# Patient Record
Sex: MALE | Race: WHITE | NOT HISPANIC OR LATINO | Employment: UNEMPLOYED | URBAN - METROPOLITAN AREA
[De-identification: names, ages, dates, MRNs, and addresses within clinical notes are randomized per-mention and may not be internally consistent; named-entity substitution may affect disease eponyms.]

---

## 2020-06-08 ENCOUNTER — TELEPHONE (OUTPATIENT)
Dept: SCHEDULING | Facility: IMAGING CENTER | Age: 35
End: 2020-06-08

## 2020-06-19 ENCOUNTER — OFFICE VISIT (OUTPATIENT)
Dept: MEDICAL GROUP | Facility: MEDICAL CENTER | Age: 35
End: 2020-06-19
Payer: COMMERCIAL

## 2020-06-19 VITALS
WEIGHT: 231.7 LBS | DIASTOLIC BLOOD PRESSURE: 66 MMHG | BODY MASS INDEX: 28.22 KG/M2 | TEMPERATURE: 99.6 F | HEIGHT: 76 IN | SYSTOLIC BLOOD PRESSURE: 120 MMHG | RESPIRATION RATE: 16 BRPM | OXYGEN SATURATION: 97 % | HEART RATE: 78 BPM

## 2020-06-19 DIAGNOSIS — Z00.00 ENCOUNTER FOR PREVENTIVE HEALTH EXAMINATION: ICD-10-CM

## 2020-06-19 DIAGNOSIS — Z13.1 DIABETES MELLITUS SCREENING: ICD-10-CM

## 2020-06-19 DIAGNOSIS — E78.5 DYSLIPIDEMIA: ICD-10-CM

## 2020-06-19 DIAGNOSIS — Z11.4 SCREENING FOR HIV (HUMAN IMMUNODEFICIENCY VIRUS): ICD-10-CM

## 2020-06-19 DIAGNOSIS — E55.9 VITAMIN D DEFICIENCY: ICD-10-CM

## 2020-06-19 PROCEDURE — 99385 PREV VISIT NEW AGE 18-39: CPT | Performed by: INTERNAL MEDICINE

## 2020-06-19 ASSESSMENT — PATIENT HEALTH QUESTIONNAIRE - PHQ9: CLINICAL INTERPRETATION OF PHQ2 SCORE: 0

## 2020-06-19 NOTE — PROGRESS NOTES
"New Patient to Establish    Reason to establish: New patient to establish    Anurag Lagunas is a 34 y.o. male who presents today with the following:    CC:   Chief Complaint   Patient presents with   • Establish Care   • Annual Exam       HPI:     Patient is here for general preventive physical exam. He has no complains  He has hx of hyperlipidemia on OTC fish oil and lifestyle modifications  Hx of vit D def on OTC vit D3 replacement        Current Outpatient Medications:   •  Fish Oil OIL, 3 Each by Does not apply route every day. Omega 3 Fish Oil (735 EPA/ 165 DHA), Disp: , Rfl:   •  Ascorbic Acid (VITAMIN C PO), Take 1 Tab by mouth as needed., Disp: , Rfl:   •  Cholecalciferol (VITAMIN D PO), Take  by mouth as needed., Disp: , Rfl:     Allergies, past medical history, past surgical history, medications, family history, social history reviewed and updated.    ROS     Constitutional: Denies fevers or chills  Eyes: Denies changes in vision  Ears/Nose/Throat/Mouth: Denies nasal congestion or sore throat   Cardiovascular: Denies chest pain or palpitations   Respiratory: Denies shortness of breath , Denies cough  Gastrointestinal/Hepatic: Denies abd pain, nausea, vomiting   Genitourinary: Denies dysuria or frequency  Musculoskeletal/Rheum: Denies joint pain and swelling   Neurological: Denies headache  Psychiatric: Denies mood disorder   Endocrine: Denies hx of diabetes or thyroid dysfunction  Heme/Oncology/Lymph Nodes: Denies weight changes or enlarged LNs.    Physical Exam  /66 (BP Location: Left arm, Patient Position: Sitting, BP Cuff Size: Adult)   Pulse 78   Temp 37.6 °C (99.6 °F) (Temporal)   Resp 16   Ht 1.93 m (6' 4\")   Wt 105.1 kg (231 lb 11.3 oz) Comment: wearing shoes  SpO2 97%   BMI 28.20 kg/m²   General: Normal appearance.  Well developed, well nourished, no acute distress.  HEENT: Normocephalic.  Extraocular motion intact. Pupils are equally round, reactive to light and accommodation, " conjunctiva clear, no scleral icterus.  Ears: normal shape and contour, ear canals clear, tympanic membranes intact. Hearing intact.  Oropharynx clear, no erythema, edema or exudate noted.  NECK: Thyroid is not enlarged. No JVD.  No carotid bruits. No masses.  Cardiovascular: Regular rhythm and rate. No murmur/rubs/gallops.   Respiratory: Normal respiratory effort, clear to auscultation bilaterally. No wheezing/rales/rhonchi.    Abdomen: Bowel sounds present, soft, nontender, nondistended, no rebound, no guarding. No hepatosplenomegaly.  : No suprapubic tenderness. No CVA tenderness.   EXT: no LE edema b/l. No cyanosis.  No clubbing.  Lymph: No cervical, supraclavicular or axillary lymph nodes are palpable  Skin: Warm and dry.  No suspicious lesions or rashes.   Neurologic: No focal deficits.  Cranial nerves II through XII grossly intact.  Sensation intact.  Deep tendon reflexes 2+  Psych: AAOx3,  Normal mood and affect, normal judgment and insight, memory within normal limits        Assessment and Plan     Encounter for preventive health examination  - Lipid Profile; Future  - TSH WITH REFLEX TO FT4; Future  - Comp Metabolic Panel; Future  - CBC WITH DIFFERENTIAL; Future     Screening for HIV (human immunodeficiency virus)  - HIV AG/AB COMBO ASSAY SCREENING; Future     Vitamin D deficiency disease  OTC vit D replacement     Dyslipidemia  - Lipid Profile; Future  Lifestyle modifications      Follow-up:Return if symptoms worsen or fail to improve.    This note was created using voice recognition software. There may be unintended errors in spelling, grammar or content.

## 2020-06-26 DIAGNOSIS — Z00.00 ENCOUNTER FOR PREVENTIVE HEALTH EXAMINATION: ICD-10-CM

## 2024-02-07 ENCOUNTER — OFFICE VISIT (OUTPATIENT)
Dept: URGENT CARE | Facility: PHYSICIAN GROUP | Age: 39
End: 2024-02-07
Payer: COMMERCIAL

## 2024-02-07 VITALS
RESPIRATION RATE: 17 BRPM | OXYGEN SATURATION: 97 % | SYSTOLIC BLOOD PRESSURE: 112 MMHG | BODY MASS INDEX: 29.35 KG/M2 | WEIGHT: 241 LBS | TEMPERATURE: 98.1 F | DIASTOLIC BLOOD PRESSURE: 70 MMHG | HEART RATE: 90 BPM | HEIGHT: 76 IN

## 2024-02-07 DIAGNOSIS — R11.0 NAUSEA: ICD-10-CM

## 2024-02-07 DIAGNOSIS — R19.7 DIARRHEA OF PRESUMED INFECTIOUS ORIGIN: ICD-10-CM

## 2024-02-07 PROCEDURE — 99213 OFFICE O/P EST LOW 20 MIN: CPT | Performed by: STUDENT IN AN ORGANIZED HEALTH CARE EDUCATION/TRAINING PROGRAM

## 2024-02-07 PROCEDURE — 3078F DIAST BP <80 MM HG: CPT | Performed by: STUDENT IN AN ORGANIZED HEALTH CARE EDUCATION/TRAINING PROGRAM

## 2024-02-07 PROCEDURE — 3074F SYST BP LT 130 MM HG: CPT | Performed by: STUDENT IN AN ORGANIZED HEALTH CARE EDUCATION/TRAINING PROGRAM

## 2024-02-07 RX ORDER — AZITHROMYCIN 500 MG/1
500 TABLET, FILM COATED ORAL DAILY
Qty: 3 TABLET | Refills: 0 | Status: SHIPPED | OUTPATIENT
Start: 2024-02-07 | End: 2024-02-10

## 2024-02-07 RX ORDER — ONDANSETRON 4 MG/1
4 TABLET, ORALLY DISINTEGRATING ORAL EVERY 6 HOURS PRN
Qty: 15 TABLET | Refills: 0 | Status: SHIPPED | OUTPATIENT
Start: 2024-02-07

## 2024-02-07 ASSESSMENT — ENCOUNTER SYMPTOMS
COUGH: 0
CHILLS: 1
VOMITING: 0
DIARRHEA: 1
SWEATS: 1
MYALGIAS: 1
FLATUS: 1
BLOATING: 1
ABDOMINAL PAIN: 1

## 2024-02-07 NOTE — PROGRESS NOTES
"Subjective     Anurag Marques Lagunas is a 38 y.o. male who presents with Cramps (X4days Stomach cramps, body aches, chills)            Ramirez is a 38 y.o. male who presents to urgent care with abdominal cramping, body aches, chills, nausea and diarrhea.  Patient states symptoms started approximately 4 days ago.  Patient reports multiple episodes of diarrhea a day.  Reports the diarrhea is soft/watery.  No known risk factors including recent antibiotic use, recent hospitalization, recent travel.  No suspected food intake or close contacts experiencing similar symptoms.  No fever but reports sweats/chills as well as body aches.  No URI-like symptoms.  Patient has tried OTC Pepto-Bismol with no relief of symptoms.  Patient is leaving for [place] this weekend for Super Bowl weekend and is hoping symptoms will improve by then.  Patient reports no blood in stool.  Patient has had decreased appetite but is tolerating p.o. food/fluids and staying hydrated.    Diarrhea   This is a new problem. The current episode started in the past 7 days. The problem occurs 5 to 10 times per day. Diarrhea characteristics: miixed soft/watery. Associated symptoms include abdominal pain, bloating, chills, increased flatus, myalgias and sweats. Pertinent negatives include no coughing, URI or vomiting. There are no known risk factors. He has tried bismuth subsalicylate for the symptoms. The treatment provided no relief.       Review of Systems   Constitutional:  Positive for chills.   Respiratory:  Negative for cough.    Gastrointestinal:  Positive for abdominal pain, bloating, diarrhea and flatus. Negative for vomiting.   Musculoskeletal:  Positive for myalgias.              Objective     /70   Pulse 90   Temp 36.7 °C (98.1 °F) (Temporal)   Resp 17   Ht 1.93 m (6' 4\")   Wt 109 kg (241 lb)   SpO2 97%   BMI 29.34 kg/m²      Physical Exam  Vitals reviewed.   Constitutional:       General: He is not in acute distress.     Appearance: " Normal appearance. He is not toxic-appearing.   HENT:      Head: Normocephalic and atraumatic.      Mouth/Throat:      Mouth: Mucous membranes are moist.   Eyes:      Extraocular Movements: Extraocular movements intact.      Conjunctiva/sclera: Conjunctivae normal.      Pupils: Pupils are equal, round, and reactive to light.   Abdominal:      General: Abdomen is flat. Bowel sounds are normal. There is no distension.      Palpations: Abdomen is soft. There is no mass.      Tenderness: There is generalized abdominal tenderness (minimal). There is no guarding or rebound.   Skin:     General: Skin is warm.   Neurological:      General: No focal deficit present.      Mental Status: He is alert.                             Assessment & Plan        1. Diarrhea of presumed infectious origin  - Per UpToDate empiric antibiotic therapy recommended due to ?6 unformed stools per 24 hours.  - azithromycin (ZITHROMAX) 500 MG tablet; Take 1 Tablet by mouth every day for 3 days.  Dispense: 3 Tablet; Refill: 0    2. Nausea  - ondansetron (ZOFRAN ODT) 4 MG TABLET DISPERSIBLE; Take 1 Tablet by mouth every 6 hours as needed for Nausea/Vomiting for up to 15 doses.  Dispense: 15 Tablet; Refill: 0     Differential diagnoses, supportive care measures and indications for immediate follow-up discussed with patient. Pathogenesis of diagnosis discussed including typical length and natural progression.      Instructed to return to urgent care or nearest emergency department if symptoms fail to improve, for any change in condition, further concerns, or new concerning symptoms.    Patient states understanding and agrees with the plan of care and discharge instructions.

## 2024-03-26 SDOH — ECONOMIC STABILITY: HOUSING INSECURITY
IN THE LAST 12 MONTHS, WAS THERE A TIME WHEN YOU DID NOT HAVE A STEADY PLACE TO SLEEP OR SLEPT IN A SHELTER (INCLUDING NOW)?: PATIENT DECLINED

## 2024-03-26 SDOH — ECONOMIC STABILITY: FOOD INSECURITY: WITHIN THE PAST 12 MONTHS, YOU WORRIED THAT YOUR FOOD WOULD RUN OUT BEFORE YOU GOT MONEY TO BUY MORE.: PATIENT DECLINED

## 2024-03-26 SDOH — ECONOMIC STABILITY: TRANSPORTATION INSECURITY
IN THE PAST 12 MONTHS, HAS THE LACK OF TRANSPORTATION KEPT YOU FROM MEDICAL APPOINTMENTS OR FROM GETTING MEDICATIONS?: PATIENT DECLINED

## 2024-03-26 SDOH — HEALTH STABILITY: PHYSICAL HEALTH: ON AVERAGE, HOW MANY MINUTES DO YOU ENGAGE IN EXERCISE AT THIS LEVEL?: PATIENT DECLINED

## 2024-03-26 SDOH — HEALTH STABILITY: MENTAL HEALTH
STRESS IS WHEN SOMEONE FEELS TENSE, NERVOUS, ANXIOUS, OR CAN'T SLEEP AT NIGHT BECAUSE THEIR MIND IS TROUBLED. HOW STRESSED ARE YOU?: PATIENT DECLINED

## 2024-03-26 SDOH — ECONOMIC STABILITY: INCOME INSECURITY: HOW HARD IS IT FOR YOU TO PAY FOR THE VERY BASICS LIKE FOOD, HOUSING, MEDICAL CARE, AND HEATING?: PATIENT DECLINED

## 2024-03-26 SDOH — ECONOMIC STABILITY: FOOD INSECURITY: WITHIN THE PAST 12 MONTHS, THE FOOD YOU BOUGHT JUST DIDN'T LAST AND YOU DIDN'T HAVE MONEY TO GET MORE.: PATIENT DECLINED

## 2024-03-26 SDOH — ECONOMIC STABILITY: TRANSPORTATION INSECURITY
IN THE PAST 12 MONTHS, HAS LACK OF RELIABLE TRANSPORTATION KEPT YOU FROM MEDICAL APPOINTMENTS, MEETINGS, WORK OR FROM GETTING THINGS NEEDED FOR DAILY LIVING?: PATIENT DECLINED

## 2024-03-26 SDOH — ECONOMIC STABILITY: HOUSING INSECURITY

## 2024-03-26 SDOH — ECONOMIC STABILITY: INCOME INSECURITY: IN THE LAST 12 MONTHS, WAS THERE A TIME WHEN YOU WERE NOT ABLE TO PAY THE MORTGAGE OR RENT ON TIME?: PATIENT DECLINED

## 2024-03-26 SDOH — ECONOMIC STABILITY: TRANSPORTATION INSECURITY
IN THE PAST 12 MONTHS, HAS LACK OF TRANSPORTATION KEPT YOU FROM MEETINGS, WORK, OR FROM GETTING THINGS NEEDED FOR DAILY LIVING?: PATIENT DECLINED

## 2024-03-26 SDOH — HEALTH STABILITY: PHYSICAL HEALTH
ON AVERAGE, HOW MANY DAYS PER WEEK DO YOU ENGAGE IN MODERATE TO STRENUOUS EXERCISE (LIKE A BRISK WALK)?: PATIENT DECLINED

## 2024-03-26 ASSESSMENT — SOCIAL DETERMINANTS OF HEALTH (SDOH)
ARE YOU MARRIED, WIDOWED, DIVORCED, SEPARATED, NEVER MARRIED, OR LIVING WITH A PARTNER?: PATIENT DECLINED
HOW OFTEN DO YOU HAVE A DRINK CONTAINING ALCOHOL: PATIENT DECLINED
IN A TYPICAL WEEK, HOW MANY TIMES DO YOU TALK ON THE PHONE WITH FAMILY, FRIENDS, OR NEIGHBORS?: PATIENT DECLINED
DO YOU BELONG TO ANY CLUBS OR ORGANIZATIONS SUCH AS CHURCH GROUPS UNIONS, FRATERNAL OR ATHLETIC GROUPS, OR SCHOOL GROUPS?: PATIENT DECLINED
HOW OFTEN DO YOU ATTEND CHURCH OR RELIGIOUS SERVICES?: PATIENT DECLINED
HOW OFTEN DO YOU GET TOGETHER WITH FRIENDS OR RELATIVES?: PATIENT DECLINED
HOW OFTEN DO YOU GET TOGETHER WITH FRIENDS OR RELATIVES?: PATIENT DECLINED
HOW OFTEN DO YOU ATTENT MEETINGS OF THE CLUB OR ORGANIZATION YOU BELONG TO?: PATIENT DECLINED
IN A TYPICAL WEEK, HOW MANY TIMES DO YOU TALK ON THE PHONE WITH FAMILY, FRIENDS, OR NEIGHBORS?: PATIENT DECLINED
ARE YOU MARRIED, WIDOWED, DIVORCED, SEPARATED, NEVER MARRIED, OR LIVING WITH A PARTNER?: PATIENT DECLINED
HOW OFTEN DO YOU HAVE SIX OR MORE DRINKS ON ONE OCCASION: PATIENT DECLINED
HOW OFTEN DO YOU ATTENT MEETINGS OF THE CLUB OR ORGANIZATION YOU BELONG TO?: PATIENT DECLINED
WITHIN THE PAST 12 MONTHS, YOU WORRIED THAT YOUR FOOD WOULD RUN OUT BEFORE YOU GOT THE MONEY TO BUY MORE: PATIENT DECLINED
DO YOU BELONG TO ANY CLUBS OR ORGANIZATIONS SUCH AS CHURCH GROUPS UNIONS, FRATERNAL OR ATHLETIC GROUPS, OR SCHOOL GROUPS?: PATIENT DECLINED
HOW HARD IS IT FOR YOU TO PAY FOR THE VERY BASICS LIKE FOOD, HOUSING, MEDICAL CARE, AND HEATING?: PATIENT DECLINED
HOW MANY DRINKS CONTAINING ALCOHOL DO YOU HAVE ON A TYPICAL DAY WHEN YOU ARE DRINKING: PATIENT DECLINED
HOW OFTEN DO YOU ATTEND CHURCH OR RELIGIOUS SERVICES?: PATIENT DECLINED

## 2024-03-26 ASSESSMENT — LIFESTYLE VARIABLES
SKIP TO QUESTIONS 9-10: 0
AUDIT-C TOTAL SCORE: -1
HOW OFTEN DO YOU HAVE A DRINK CONTAINING ALCOHOL: PATIENT DECLINED
HOW OFTEN DO YOU HAVE SIX OR MORE DRINKS ON ONE OCCASION: PATIENT DECLINED
HOW MANY STANDARD DRINKS CONTAINING ALCOHOL DO YOU HAVE ON A TYPICAL DAY: PATIENT DECLINED

## 2024-03-29 ENCOUNTER — OFFICE VISIT (OUTPATIENT)
Dept: MEDICAL GROUP | Facility: IMAGING CENTER | Age: 39
End: 2024-03-29
Payer: COMMERCIAL

## 2024-03-29 VITALS
HEIGHT: 76 IN | HEART RATE: 77 BPM | SYSTOLIC BLOOD PRESSURE: 128 MMHG | DIASTOLIC BLOOD PRESSURE: 72 MMHG | TEMPERATURE: 97 F | BODY MASS INDEX: 29.96 KG/M2 | WEIGHT: 246 LBS | OXYGEN SATURATION: 97 %

## 2024-03-29 DIAGNOSIS — Z11.3 ROUTINE SCREENING FOR STI (SEXUALLY TRANSMITTED INFECTION): ICD-10-CM

## 2024-03-29 DIAGNOSIS — Z00.00 ROUTINE GENERAL MEDICAL EXAMINATION AT HEALTH CARE FACILITY: ICD-10-CM

## 2024-03-29 DIAGNOSIS — E78.5 DYSLIPIDEMIA: ICD-10-CM

## 2024-03-29 DIAGNOSIS — E55.9 VITAMIN D DEFICIENCY: ICD-10-CM

## 2024-03-29 DIAGNOSIS — Z11.59 NEED FOR HEPATITIS C SCREENING TEST: ICD-10-CM

## 2024-03-29 DIAGNOSIS — Z00.6 RESEARCH STUDY PATIENT: ICD-10-CM

## 2024-03-29 DIAGNOSIS — Z11.4 SCREENING FOR HIV (HUMAN IMMUNODEFICIENCY VIRUS): ICD-10-CM

## 2024-03-29 DIAGNOSIS — Z13.1 DIABETES MELLITUS SCREENING: ICD-10-CM

## 2024-03-29 DIAGNOSIS — N50.89 SWELLING OF RIGHT HALF OF SCROTUM: ICD-10-CM

## 2024-03-29 ASSESSMENT — PATIENT HEALTH QUESTIONNAIRE - PHQ9: CLINICAL INTERPRETATION OF PHQ2 SCORE: 0

## 2024-03-29 NOTE — PROGRESS NOTES
CC: Well annual exam    Subjective:     Anurag Lagunas is a 38 y.o. male presents for a routine preventive health exam.      Health Maintenance  Encourage a healthy diet and regular exercise.  BP Screening: normal  Cholesterol Screening: ordered   Prediabetes/Diabetes screening: ordered   Depression screening: negative  Anxiety screening: negative   Statin Use: None     Tobacco use: Chew Nicotine pouch. Counseling provided.  ETOH use: 8 drinks on Friday. Counseling provided.  Unhealthy drug use: No known recreational drug use. Counseling provided.  Safe in the relationship.   Seat belts, bike helmets, and gun safety were discussed.  Sun protection used.    Cancer screening  Colorectal cancer screening (45-75,  FIT yearly or Stool DNA-FIT every 1-3 yrs or CT colonoscopy every 5 yrs or Colonoscopy every 10 yrs):  N/A    Infectious disease screening/Immunizations  --STI Screening: ordered   --Practices safe sex: Yes  --HIV Screening: ordered  --Hepatitis C Screening: ordered   --Immunizations: patient declines vaccines,except for TDAP  Flu vaccine: recommend  Tdap: recommend    COVID-19 vaccine: patient declines  Other immunizations:   Pneumococcal: N/A  PCV20:  PCV15:  PPSV23:  PCV13:    Swelling of right half of scrotum  He noticed right scrotal swelling for several months. Gradually getting larger.        Review of systems:  Denies any weight loss, fevers, fatigue, vision changes, sore throat, swollen glands, rashes, shortness of breath, chest pain, numbness, nausea, vomiting, diarrhea, constipation, abdominal pain, dysuria, hematuria, joint pain, muscle weakness, depression.} All systems were reviewed and are negative.    Allergies, past medical history, past surgical history, medications, family history, social history reviewed and updated.      Current Outpatient Medications:     ondansetron (ZOFRAN ODT) 4 MG TABLET DISPERSIBLE, Take 1 Tablet by mouth every 6 hours as needed for Nausea/Vomiting for up to 15  doses., Disp: 15 Tablet, Rfl: 0    Fish Oil OIL, 3 Each by Does not apply route every day. Omega 3 Fish Oil (735 EPA/ 165 DHA), Disp: , Rfl:     Ascorbic Acid (VITAMIN C PO), Take 1 Tab by mouth as needed., Disp: , Rfl:     Cholecalciferol (VITAMIN D PO), Take  by mouth as needed., Disp: , Rfl:     Allergies   Allergen Reactions    Ceftriaxone Sodium Hives    Rocephin [Ceftriaxone Sodium] Hives    Sulfa Drugs Rash       Past Medical History:   Diagnosis Date    Bronchitis     as a child    Hyperlipidemia     Pneumonia     as,a child     Past Surgical History:   Procedure Laterality Date    UMBILICAL HERNIA REPAIR  2/28/2014    Performed by Dudley Alvarado M.D. at SURGERY [facility]     Family History   Problem Relation Age of Onset    Thyroid Mother     Heart Disease Father         Hyperlipidemia/CAD    Hypertension Father     Cancer Maternal Uncle         Leukemia    Cancer Maternal Uncle     Breast Cancer Paternal Aunt     Cancer Maternal Grandfather     Cancer Paternal Grandfather     BRCA 1 Neg Hx     BRCA 2 Neg Hx     BRCA 1 Carrier  Neg Hx     BRCA 2 Carrier Neg Hx      Social History     Socioeconomic History    Marital status: Single     Spouse name: Not on file    Number of children: 0    Years of education: 18    Highest education level: Not on file   Occupational History    Occupation: Construction   Tobacco Use    Smoking status: Never    Smokeless tobacco: Former     Types: Chew    Tobacco comments:     cigars socially   Vaping Use    Vaping Use: Never used   Substance and Sexual Activity    Alcohol use: Yes     Alcohol/week: 7.5 oz     Types: 15 Standard drinks or equivalent per week     Comment: 6 drinks per week    Drug use: No    Sexual activity: Not Currently     Partners: Female   Other Topics Concern    Not on file   Social History Narrative    Not on file     Social Determinants of Health     Financial Resource Strain: Patient Declined (3/26/2024)    Overall Financial Resource Strain  "(CARDIA)     Difficulty of Paying Living Expenses: Patient declined   Food Insecurity: Patient Declined (3/26/2024)    Hunger Vital Sign     Worried About Running Out of Food in the Last Year: Patient declined     Ran Out of Food in the Last Year: Patient declined   Transportation Needs: Patient Declined (3/26/2024)    PRAPARE - Transportation     Lack of Transportation (Medical): Patient declined     Lack of Transportation (Non-Medical): Patient declined   Physical Activity: Patient Declined (3/26/2024)    Exercise Vital Sign     Days of Exercise per Week: Patient declined     Minutes of Exercise per Session: Patient declined   Stress: Patient Declined (3/26/2024)    [employer] - Occupational Stress Questionnaire     Feeling of Stress : Patient declined   Social Connections: Patient Declined (3/26/2024)    Social Connection and Isolation Panel [NHANES]     Frequency of Communication with Friends and Family: Patient declined     Frequency of Social Gatherings with Friends and Family: Patient declined     Attends Anabaptist Services: Patient declined     Active Member of Clubs or Organizations: Patient declined     Attends Club or Organization Meetings: Patient declined     Marital Status: Patient declined   Intimate Partner Violence: Not on file   Housing Stability: Patient Declined (3/26/2024)    Housing Stability Vital Sign     Unable to Pay for Housing in the Last Year: Patient declined     Number of Places Lived in the Last Year: Not on file     Unstable Housing in the Last Year: Patient declined       Objective:     Vitals: /72 (BP Location: Right arm, Patient Position: Sitting, BP Cuff Size: Large adult)   Pulse 77   Temp 36.1 °C (97 °F) (Temporal)   Ht 1.93 m (6' 4\")   Wt 112 kg (246 lb)   SpO2 97%   BMI 29.94 kg/m²   General: Alert, pleasant, NAD  HEENT:  Normocephalic.  PERRL, EOMI, no icterus or pallor.  Conjunctivae and lids normal.  External ears normal. Tympanic " membranes pearly, opaque.  Nares patent, mucosa pink.  Oropharynx non-erythematous, mucous membranes moist.  Neck supple.  No thyromegaly or masses palpated. No cervical or supraclavicular lymphadenopathy.  Heart:  Regular rate and rhythm.  S1 and S2 normal.  No murmurs appreciated.  Respiratory:  Normal respiratory effort.  Clear to auscultation bilaterally.  Abdomen:  Bowel sounds present.  Non-distended, soft, non-tender, no guarding/rebound. No hepatosplenomegaly.  No hernias.  Skin:  Warm, dry, no rashes  Musculoskeletal:  Gait is normal.  Moves all extremities well.  Extremities:  Pedal pulses 2+ symmetric. No leg edema.  Neurological: No tremors, sensation grossly intact, patellar and biceps reflexes 2+ symmetric, tone/strength normal  Psych:  Affect/mood is normal, judgement is good, memory is intact, grooming is appropriate.   : right scrotal swelling, non-tender      Assessment/Plan:     Anurag was seen today for establish care and testicle pain.    Diagnoses and all orders for this visit:    Routine screening for STI (sexually transmitted infection)  -     HIV AG/AB COMBO ASSAY SCREENING; Future  -     HEP C VIRUS ANTIBODY; Future  -     Chlamydia/GC, PCR (Urine); Future  -     T.Pallidum AB JOSE L (Screening); Future  -     Trichomonas Vaginalis by TMA; Future  -     HEP B Surface Antibody; Future  -     Hep B Core AB Total; Future  -     Hep B Surface Antigen; Future    Swelling of right half of scrotum  -     [id number]; Future    Vitamin D deficiency disease  -     VITAMIN D,25 HYDROXY (DEFICIENCY); Future    Need for hepatitis C screening test  -     HEP C VIRUS ANTIBODY; Future    Screening for HIV (human immunodeficiency virus)  -     HIV AG/AB COMBO ASSAY SCREENING; Future    Routine general medical examination at health care facility  -     CBC WITH DIFFERENTIAL; Future  -     Comp Metabolic Panel; Future  -     Lipid Profile; Future  -     URINALYSIS,CULTURE IF INDICATED; Future  -      TSH WITH REFLEX TO FT4; Future    Diabetes mellitus screening  -     Comp Metabolic Panel; Future    Dyslipidemia  -     Lipid Profile; Future  -     TSH WITH REFLEX TO FT4; Future    Research study patient  -     Referral to Genetic Research Studies          Patient Counseling:  --Discussed healthful lifestyle measures such as moderation in sodium/caffeine intake, saturated fat and cholesterol, caloric balance, sufficient fresh fruits/vegetables, fiber, vitamin D replacement, and good sleep hygiene.  --Encouraged regular exercise.   --Discussed brushing, flossing, and dental visits.   --Discussed tobacco, alcohol, or other unhealthy drug use availability of abuse treatment.   --Discussed safe sex practices.  --Injury prevention: Discussed safety belts, safety helmets, smoke/CO detectors, etc.    Return in about 6 weeks (around 5/10/2024), or if symptoms worsen or fail to improve.

## 2024-04-01 ENCOUNTER — HOSPITAL ENCOUNTER (OUTPATIENT)
Dept: RADIOLOGY | Facility: MEDICAL CENTER | Age: 39
End: 2024-04-01
Attending: INTERNAL MEDICINE
Payer: COMMERCIAL

## 2024-04-01 DIAGNOSIS — N50.89 SWELLING OF RIGHT HALF OF SCROTUM: ICD-10-CM

## 2024-04-01 PROCEDURE — 76870 US EXAM SCROTUM: CPT

## 2024-04-02 DIAGNOSIS — N50.3 EPIDIDYMAL CYST: ICD-10-CM

## 2024-04-02 DIAGNOSIS — N50.89 SCROTAL SWELLING: ICD-10-CM

## 2024-04-08 ENCOUNTER — RESEARCH ENCOUNTER (OUTPATIENT)
Dept: RESEARCH | Facility: MEDICAL CENTER | Age: 39
End: 2024-04-08

## 2024-04-08 NOTE — RESEARCH NOTE
Patient has been referred by Ole Mcleod M.D.. Sent initial referral follow-up message with instructions to locate and sign consent form(s). The following consent form(s) have been pushed to the patient's MyChart: JOSE

## 2024-04-11 ENCOUNTER — OFFICE VISIT (OUTPATIENT)
Dept: UROLOGY | Facility: MEDICAL CENTER | Age: 39
End: 2024-04-11
Payer: COMMERCIAL

## 2024-04-11 VITALS
TEMPERATURE: 98.4 F | WEIGHT: 247 LBS | BODY MASS INDEX: 30.08 KG/M2 | HEART RATE: 68 BPM | DIASTOLIC BLOOD PRESSURE: 70 MMHG | SYSTOLIC BLOOD PRESSURE: 120 MMHG | HEIGHT: 76 IN | OXYGEN SATURATION: 95 %

## 2024-04-11 DIAGNOSIS — N50.3 EPIDIDYMAL CYST: ICD-10-CM

## 2024-04-11 PROCEDURE — 99203 OFFICE O/P NEW LOW 30 MIN: CPT | Performed by: STUDENT IN AN ORGANIZED HEALTH CARE EDUCATION/TRAINING PROGRAM

## 2024-04-11 PROCEDURE — 3074F SYST BP LT 130 MM HG: CPT | Performed by: STUDENT IN AN ORGANIZED HEALTH CARE EDUCATION/TRAINING PROGRAM

## 2024-04-11 PROCEDURE — 3078F DIAST BP <80 MM HG: CPT | Performed by: STUDENT IN AN ORGANIZED HEALTH CARE EDUCATION/TRAINING PROGRAM

## 2024-04-11 NOTE — PROGRESS NOTES
Subjective  Anurag Lagunas is a 38 y.o. male who presents today for evaluation of a large right epididymal cyst. This has been present for awhile but recently started getting larger since January. He has no pain, difficulty with clothing or activities. He is not overly bothered by the cyst at this time but wanted to get evaluated. He works in construction.     Family History   Problem Relation Age of Onset    Thyroid Mother     Heart Disease Father         Hyperlipidemia/CAD    Hypertension Father     Cancer Maternal Uncle         Leukemia    Cancer Maternal Uncle     Breast Cancer Paternal Aunt     Cancer Maternal Grandfather     Cancer Paternal Grandfather     BRCA 1 Neg Hx     BRCA 2 Neg Hx     BRCA 1 Carrier  Neg Hx     BRCA 2 Carrier Neg Hx        Social History     Socioeconomic History    Marital status: Single     Spouse name: Not on file    Number of children: 0    Years of education: 18    Highest education level: Not on file   Occupational History    Occupation: Construction   Tobacco Use    Smoking status: Never    Smokeless tobacco: Former     Types: Chew    Tobacco comments:     cigars socially   Vaping Use    Vaping Use: Never used   Substance and Sexual Activity    Alcohol use: Yes     Alcohol/week: 7.5 oz     Types: 15 Standard drinks or equivalent per week     Comment: 6 drinks per week    Drug use: No    Sexual activity: Not Currently     Partners: Female   Other Topics Concern    Not on file   Social History Narrative    Not on file     Social Determinants of Health     Financial Resource Strain: Patient Declined (3/26/2024)    Overall Financial Resource Strain (CARDIA)     Difficulty of Paying Living Expenses: Patient declined   Food Insecurity: Patient Declined (3/26/2024)    Hunger Vital Sign     Worried About Running Out of Food in the Last Year: Patient declined     Ran Out of Food in the Last Year: Patient declined   Transportation Needs: Patient Declined (3/26/2024)    PRAPARE -  Transportation     Lack of Transportation (Medical): Patient declined     Lack of Transportation (Non-Medical): Patient declined   Physical Activity: Patient Declined (3/26/2024)    Exercise Vital Sign     Days of Exercise per Week: Patient declined     Minutes of Exercise per Session: Patient declined   Stress: Patient Declined (3/26/2024)    [employer] - Occupational Stress Questionnaire     Feeling of Stress : Patient declined   Social Connections: Patient Declined (3/26/2024)    Social Connection and Isolation Panel [NHANES]     Frequency of Communication with Friends and Family: Patient declined     Frequency of Social Gatherings with Friends and Family: Patient declined     Attends Jew Services: Patient declined     Active Member of Clubs or Organizations: Patient declined     Attends Club or Organization Meetings: Patient declined     Marital Status: Patient declined   Intimate Partner Violence: Not on file   Housing Stability: Patient Declined (3/26/2024)    Housing Stability Vital Sign     Unable to Pay for Housing in the Last Year: Patient declined     Number of Places Lived in the Last Year: Not on file     Unstable Housing in the Last Year: Patient declined       Past Surgical History:   Procedure Laterality Date    UMBILICAL HERNIA REPAIR  2/28/2014    Performed by Dudley Alvarado M.D. at SURGERY [facility]       Past Medical History:   Diagnosis Date    Bronchitis     as a child    Hyperlipidemia     Pneumonia     as,a child       Current Outpatient Medications   Medication Sig Dispense Refill    Fish Oil OIL 3 Each by Does not apply route every day. Omega 3 Fish Oil (735 EPA/ 165 DHA)      Ascorbic Acid (VITAMIN C PO) Take 1 Tab by mouth as needed.      Cholecalciferol (VITAMIN D PO) Take  by mouth as needed.      ondansetron (ZOFRAN ODT) 4 MG TABLET DISPERSIBLE Take 1 Tablet by mouth every 6 hours as needed for Nausea/Vomiting for up to 15 doses. (Patient not  "taking: Reported on 4/11/2024) 15 Tablet 0     No current facility-administered medications for this visit.       Allergies   Allergen Reactions    Ceftriaxone Sodium Hives    Rocephin [Ceftriaxone Sodium] Hives    Sulfa Drugs Rash       Objective  /70 (BP Location: Right arm, Patient Position: Sitting, BP Cuff Size: Adult)   Pulse 68   Temp 36.9 °C (98.4 °F) (Temporal)   Ht 1.93 m (6' 4\")   Wt 112 kg (247 lb)   SpO2 95%   Physical Exam  Constitutional:       Appearance: Normal appearance. He is normal weight.   HENT:      Head: Normocephalic and atraumatic.   Pulmonary:      Effort: Pulmonary effort is normal.   Genitourinary:     Testes: Normal.      Comments: Large right epididymal cyst. Mobile, non-tender to palpation.   Skin:     General: Skin is warm and dry.   Neurological:      General: No focal deficit present.      Mental Status: He is alert.   Psychiatric:         Mood and Affect: Mood normal.         Behavior: Behavior normal.         Labs: none    Imaging:   US SCROTAL   [id number] 04/01/2024    Narrative  4/1/2024 4:54 PM    HISTORY/REASON FOR EXAM: Swelling. Right scrotal swelling    TECHNIQUE/EXAM DESCRIPTION:  Real-time sonography of the scrotum was performed with gray-scale, color and duplex Doppler imaging.    COMPARISON: None    FINDINGS:    The right testis measures 5.51 cm x 2.88 cm x 3.50 cm,4.32 cm. Normal in size and echotexture. Normal vascularity on color Doppler. No intratesticular mass.    The left testis measures 5.25 cm x 2.28 cm x 3.48 cm. Normal in size and echotexture. Normal vascularity on color Doppler. No intratesticular mass.    Vascular flow is symmetric.    Contiguous with the right epididymal head and probably associated with the is a complicated cyst measuring 6.8 x 5.5 x 3.5 cm    No abnormal volume hydrocele is detected.    No varicocele is detected.    Impression  1.  Normal sonographic appearance of both testicle    2.  6.8 cm right epididymal head " complicated cyst, likely a spermatocele      Assessment    Mr. Lagunas is a 38 year old gentleman with a large right epididymal cyst. Scrotal US and exam are reassuring for a benign cyst. We discussed options of observation vs. Surgical excision. 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 He would like to hold off on removal until the fall. We will see him back in September to set up for surgery in October/November. Risks of surgery include bleeding, infection, damage to surrounding structures, obstruction of the epididymis/vas deferens, possible infertility. As his left testicle and epididymis are completely normal his risk of infertility would be low. He would like to proceed when the projects slow down.    Plan    Problem List Items Addressed This Visit    None  RTC September to schedule right epididymal cyst remoal

## 2024-05-10 ENCOUNTER — HOSPITAL ENCOUNTER (OUTPATIENT)
Dept: LAB | Facility: MEDICAL CENTER | Age: 39
End: 2024-05-10
Attending: INTERNAL MEDICINE
Payer: COMMERCIAL

## 2024-05-10 DIAGNOSIS — Z11.59 NEED FOR HEPATITIS C SCREENING TEST: ICD-10-CM

## 2024-05-10 DIAGNOSIS — Z00.00 ROUTINE GENERAL MEDICAL EXAMINATION AT HEALTH CARE FACILITY: ICD-10-CM

## 2024-05-10 DIAGNOSIS — E78.5 DYSLIPIDEMIA: ICD-10-CM

## 2024-05-10 DIAGNOSIS — Z11.3 ROUTINE SCREENING FOR STI (SEXUALLY TRANSMITTED INFECTION): ICD-10-CM

## 2024-05-10 DIAGNOSIS — Z11.4 SCREENING FOR HIV (HUMAN IMMUNODEFICIENCY VIRUS): ICD-10-CM

## 2024-05-10 DIAGNOSIS — Z13.1 DIABETES MELLITUS SCREENING: ICD-10-CM

## 2024-05-10 DIAGNOSIS — E55.9 VITAMIN D DEFICIENCY: ICD-10-CM

## 2024-05-10 LAB
25(OH)D3 SERPL-MCNC: 22 NG/ML (ref 30–100)
ALBUMIN SERPL BCP-MCNC: 5.1 G/DL (ref 3.2–4.9)
ALBUMIN/GLOB SERPL: 1.8 G/DL
ALP SERPL-CCNC: 58 U/L (ref 30–99)
ALT SERPL-CCNC: 34 U/L (ref 2–50)
AMORPH CRY #/AREA URNS HPF: PRESENT /HPF
ANION GAP SERPL CALC-SCNC: 13 MMOL/L (ref 7–16)
APPEARANCE UR: ABNORMAL
AST SERPL-CCNC: 33 U/L (ref 12–45)
BACTERIA #/AREA URNS HPF: NEGATIVE /HPF
BASOPHILS # BLD AUTO: 0.7 % (ref 0–1.8)
BASOPHILS # BLD: 0.04 K/UL (ref 0–0.12)
BILIRUB SERPL-MCNC: 0.6 MG/DL (ref 0.1–1.5)
BILIRUB UR QL STRIP.AUTO: NEGATIVE
BUN SERPL-MCNC: 25 MG/DL (ref 8–22)
CALCIUM ALBUM COR SERPL-MCNC: 8.8 MG/DL (ref 8.5–10.5)
CALCIUM SERPL-MCNC: 9.7 MG/DL (ref 8.5–10.5)
CHLORIDE SERPL-SCNC: 102 MMOL/L (ref 96–112)
CHOLEST SERPL-MCNC: 258 MG/DL (ref 100–199)
CO2 SERPL-SCNC: 24 MMOL/L (ref 20–33)
COLOR UR: YELLOW
CREAT SERPL-MCNC: 0.96 MG/DL (ref 0.5–1.4)
EOSINOPHIL # BLD AUTO: 0.06 K/UL (ref 0–0.51)
EOSINOPHIL NFR BLD: 1.1 % (ref 0–6.9)
EPI CELLS #/AREA URNS HPF: NEGATIVE /HPF
ERYTHROCYTE [DISTWIDTH] IN BLOOD BY AUTOMATED COUNT: 43.9 FL (ref 35.9–50)
GFR SERPLBLD CREATININE-BSD FMLA CKD-EPI: 104 ML/MIN/1.73 M 2
GLOBULIN SER CALC-MCNC: 2.9 G/DL (ref 1.9–3.5)
GLUCOSE SERPL-MCNC: 90 MG/DL (ref 65–99)
GLUCOSE UR STRIP.AUTO-MCNC: NEGATIVE MG/DL
HBV CORE AB SERPL QL IA: NONREACTIVE
HBV SURFACE AB SERPL IA-ACNC: 91 MIU/ML (ref 0–10)
HBV SURFACE AG SER QL: NORMAL
HCT VFR BLD AUTO: 48.8 % (ref 42–52)
HCV AB SER QL: NORMAL
HDLC SERPL-MCNC: 37 MG/DL
HGB BLD-MCNC: 16.5 G/DL (ref 14–18)
HIV 1+2 AB+HIV1 P24 AG SERPL QL IA: NORMAL
HYALINE CASTS #/AREA URNS LPF: ABNORMAL /LPF
IMM GRANULOCYTES # BLD AUTO: 0.01 K/UL (ref 0–0.11)
IMM GRANULOCYTES NFR BLD AUTO: 0.2 % (ref 0–0.9)
KETONES UR STRIP.AUTO-MCNC: NEGATIVE MG/DL
LDLC SERPL CALC-MCNC: 172 MG/DL
LEUKOCYTE ESTERASE UR QL STRIP.AUTO: NEGATIVE
LYMPHOCYTES # BLD AUTO: 1.87 K/UL (ref 1–4.8)
LYMPHOCYTES NFR BLD: 34 % (ref 22–41)
MCH RBC QN AUTO: 30.8 PG (ref 27–33)
MCHC RBC AUTO-ENTMCNC: 33.8 G/DL (ref 32.3–36.5)
MCV RBC AUTO: 91 FL (ref 81.4–97.8)
MICRO URNS: ABNORMAL
MONOCYTES # BLD AUTO: 0.38 K/UL (ref 0–0.85)
MONOCYTES NFR BLD AUTO: 6.9 % (ref 0–13.4)
NEUTROPHILS # BLD AUTO: 3.14 K/UL (ref 1.82–7.42)
NEUTROPHILS NFR BLD: 57.1 % (ref 44–72)
NITRITE UR QL STRIP.AUTO: POSITIVE
NRBC # BLD AUTO: 0 K/UL
NRBC BLD-RTO: 0 /100 WBC (ref 0–0.2)
PH UR STRIP.AUTO: 6 [PH] (ref 5–8)
PLATELET # BLD AUTO: 217 K/UL (ref 164–446)
PMV BLD AUTO: 12.5 FL (ref 9–12.9)
POTASSIUM SERPL-SCNC: 4.1 MMOL/L (ref 3.6–5.5)
PROT SERPL-MCNC: 8 G/DL (ref 6–8.2)
PROT UR QL STRIP: NEGATIVE MG/DL
RBC # BLD AUTO: 5.36 M/UL (ref 4.7–6.1)
RBC # URNS HPF: ABNORMAL /HPF
RBC UR QL AUTO: ABNORMAL
SODIUM SERPL-SCNC: 139 MMOL/L (ref 135–145)
SP GR UR STRIP.AUTO: >=1.03
T PALLIDUM AB SER QL IA: NORMAL
TRIGL SERPL-MCNC: 247 MG/DL (ref 0–149)
TSH SERPL DL<=0.005 MIU/L-ACNC: 1.98 UIU/ML (ref 0.38–5.33)
UROBILINOGEN UR STRIP.AUTO-MCNC: 0.2 MG/DL
WBC # BLD AUTO: 5.5 K/UL (ref 4.8–10.8)
WBC #/AREA URNS HPF: ABNORMAL /HPF

## 2024-05-11 LAB
C TRACH DNA SPEC QL NAA+PROBE: NEGATIVE
N GONORRHOEA DNA SPEC QL NAA+PROBE: NEGATIVE
SPECIMEN SOURCE: NORMAL

## 2024-05-14 LAB
SPEC CONTAINER SPEC: NORMAL
SPECIMEN SOURCE: NORMAL
T VAGINALIS RRNA SPEC QL NAA+PROBE: NEGATIVE

## 2024-05-17 ENCOUNTER — OFFICE VISIT (OUTPATIENT)
Dept: MEDICAL GROUP | Facility: IMAGING CENTER | Age: 39
End: 2024-05-17
Payer: COMMERCIAL

## 2024-05-17 VITALS
WEIGHT: 252.4 LBS | HEIGHT: 76 IN | HEART RATE: 82 BPM | OXYGEN SATURATION: 99 % | BODY MASS INDEX: 30.74 KG/M2 | TEMPERATURE: 97.6 F | SYSTOLIC BLOOD PRESSURE: 128 MMHG | RESPIRATION RATE: 15 BRPM | DIASTOLIC BLOOD PRESSURE: 84 MMHG

## 2024-05-17 DIAGNOSIS — E78.5 DYSLIPIDEMIA: ICD-10-CM

## 2024-05-17 DIAGNOSIS — E55.9 VITAMIN D DEFICIENCY: ICD-10-CM

## 2024-05-17 DIAGNOSIS — R79.9 ELEVATED BUN: ICD-10-CM

## 2024-05-17 DIAGNOSIS — L98.9 SKIN LESION: ICD-10-CM

## 2024-05-17 DIAGNOSIS — R82.90 ABNORMAL URINALYSIS: ICD-10-CM

## 2024-05-17 DIAGNOSIS — Z12.83 SKIN CANCER SCREENING: ICD-10-CM

## 2024-05-17 PROCEDURE — 1126F AMNT PAIN NOTED NONE PRSNT: CPT | Performed by: INTERNAL MEDICINE

## 2024-05-17 PROCEDURE — 3079F DIAST BP 80-89 MM HG: CPT | Performed by: INTERNAL MEDICINE

## 2024-05-17 PROCEDURE — 99214 OFFICE O/P EST MOD 30 MIN: CPT | Performed by: INTERNAL MEDICINE

## 2024-05-17 PROCEDURE — 3074F SYST BP LT 130 MM HG: CPT | Performed by: INTERNAL MEDICINE

## 2024-05-17 ASSESSMENT — FIBROSIS 4 INDEX: FIB4 SCORE: 0.96

## 2024-05-17 ASSESSMENT — PAIN SCALES - GENERAL: PAINLEVEL: NO PAIN

## 2024-05-17 NOTE — PROGRESS NOTES
"Established Patient    Anurag Lagunas is a 38 y.o. male who presents today with the following:    CC:   Chief Complaint   Patient presents with    Lab Results    Other     Sun spot on the left side of his face first noticed it back in 2019        HPI:     History of Present Illness      Problem   Skin Lesion    Flat red skin lesion since 2019     Dyslipidemia    Chronic, uncontrolled  Family history of hyperlipidemia  He does not want pharmacotherapy at this time  Healthful lifestyle measures  He might try OTC omega-3 fish oil, red yeast rice or cholestoff plus   Latest Reference Range & Units 05/10/24 14:22   Cholesterol,Tot 100 - 199 mg/dL 258 (H)   Triglycerides 0 - 149 mg/dL 247 (H)   HDL >=40 mg/dL 37 !   LDL <100 mg/dL 172 (H)   (H): Data is abnormally high  !: Data is abnormal    To decrease triglyceride levels:  1. Increase Omega 3 intake- fish, olive oil, may take fish oil 1 gram a day  2. Decrease Omega 6 intake- avoid grain-related products- such as grain-fed chicken and grain-fed beef- choose range-free and grass-fed    To increase HDL (\"good cholesterol\"):  Increase exercise as tolerated- goal 30 minutes every day; does not have to be all at once    To decrease LDL:  1. Limit saturated fat (saturated fat is solid- such as lard, butter, evans, coconut oil, palm oil (found in pastries)  2. Follow proper food proportions described in the \"ACP healthy plate\" for appropriate cholesterol intake. Limit high-cholesterol foods.  3. Consider including omega-3 rich foods such as salmon and flax seed powder in your diet.          Vitamin D deficiency disease    Low vitamin D  Recommend vitamin D3 4812-5548 international units daily            Current Outpatient Medications   Medication Sig    Ascorbic Acid (VITAMIN C PO) Take 1 Tab by mouth as needed.    Cholecalciferol (VITAMIN D PO) Take  by mouth as needed.    ondansetron (ZOFRAN ODT) 4 MG TABLET DISPERSIBLE Take 1 Tablet by mouth every 6 hours as needed " "for Nausea/Vomiting for up to 15 doses.     Allergies   Allergen Reactions    Ceftriaxone Sodium Hives    Rocephin [Ceftriaxone Sodium] Hives    Sulfa Drugs Rash       Allergies, past medical history, past surgical history, medications, family history, social history reviewed and updated.    ROS Please see HPI    Physical Exam  Vitals: /84 (BP Location: Left arm, Patient Position: Sitting, BP Cuff Size: Adult)   Pulse 82   Temp 36.4 °C (97.6 °F) (Temporal)   Resp 15   Ht 1.93 m (6' 4\")   Wt 114 kg (252 lb 6.4 oz)   SpO2 99%   BMI 30.72 kg/m²   Physical Exam  Constitutional:       General: He is not in acute distress.     Appearance: Normal appearance.   HENT:      Head: Normocephalic and atraumatic.      Nose: Nose normal.      Mouth/Throat:      Pharynx: Oropharynx is clear.   Eyes:      Extraocular Movements: Extraocular movements intact.      Conjunctiva/sclera: Conjunctivae normal.   Cardiovascular:      Rate and Rhythm: Normal rate and regular rhythm.   Pulmonary:      Effort: Pulmonary effort is normal.      Breath sounds: Normal breath sounds.   Abdominal:      General: Bowel sounds are normal.      Palpations: Abdomen is soft.      Tenderness: There is no abdominal tenderness.   Musculoskeletal:      Right lower leg: No edema.      Left lower leg: No edema.   Skin:     Comments: Left cheek flat skin lesion   Neurological:      Mental Status: He is alert. Mental status is at baseline.   Psychiatric:         Mood and Affect: Mood normal.         Behavior: Behavior normal.         Thought Content: Thought content normal.         Judgment: Judgment normal.         Labs (5/10/24) were reviewed and discussed with patients.    All questions were answered.      Assessment and Plan    1. Skin cancer screening  - Referral to Dermatology    2. Dyslipidemia  - Lipid Profile; Future  - Lipoprotein (a); Future  - CRP HIGH SENSITIVE (CARDIAC); Future    Chronic, uncontrolled  Family history of " "hyperlipidemia  He does not want pharmacotherapy at this time  Healthful lifestyle measures  He might try OTC omega-3 fish oil, red yeast rice or cholestoff plus   Latest Reference Range & Units 05/10/24 14:22   Cholesterol,Tot 100 - 199 mg/dL 258 (H)   Triglycerides 0 - 149 mg/dL 247 (H)   HDL >=40 mg/dL 37 !   LDL <100 mg/dL 172 (H)   (H): Data is abnormally high  !: Data is abnormal    To decrease triglyceride levels:  1. Increase Omega 3 intake- fish, olive oil, may take fish oil 1 gram a day  2. Decrease Omega 6 intake- avoid grain-related products- such as grain-fed chicken and grain-fed beef- choose range-free and grass-fed    To increase HDL (\"good cholesterol\"):  Increase exercise as tolerated- goal 30 minutes every day; does not have to be all at once    To decrease LDL:  1. Limit saturated fat (saturated fat is solid- such as lard, butter, evans, coconut oil, palm oil (found in pastries)  2. Follow proper food proportions described in the \"ACP healthy plate\" for appropriate cholesterol intake. Limit high-cholesterol foods.  3. Consider including omega-3 rich foods such as salmon and flax seed powder in your diet.       3. Vitamin D deficiency disease  - VITAMIN D,25 HYDROXY (DEFICIENCY); Future  Recommend vitamin D3 5861-8860 international units daily    4. Abnormal urinalysis  - URINALYSIS,CULTURE IF INDICATED; Future    5. Elevated BUN  - Comp Metabolic Panel; Future  Encourage oral hydration    6. Skin lesion  - Referral to Dermatology          Follow-up:Return in about 6 months (around 11/17/2024), or if symptoms worsen or fail to improve.    This note was created using voice recognition software. There may be unintended errors in spelling, grammar or content.      "

## 2024-05-17 NOTE — ASSESSMENT & PLAN NOTE
"Chronic, uncontrolled  Family history of hyperlipidemia  He does not want pharmacotherapy at this time  Healthful lifestyle measures  He might try OTC omega-3 fish oil, red yeast rice or cholestoff plus   Latest Reference Range & Units 05/10/24 14:22   Cholesterol,Tot 100 - 199 mg/dL 258 (H)   Triglycerides 0 - 149 mg/dL 247 (H)   HDL >=40 mg/dL 37 !   LDL <100 mg/dL 172 (H)   (H): Data is abnormally high  !: Data is abnormal    To decrease triglyceride levels:  1. Increase Omega 3 intake- fish, olive oil, may take fish oil 1 gram a day  2. Decrease Omega 6 intake- avoid grain-related products- such as grain-fed chicken and grain-fed beef- choose range-free and grass-fed    To increase HDL (\"good cholesterol\"):  Increase exercise as tolerated- goal 30 minutes every day; does not have to be all at once    To decrease LDL:  1. Limit saturated fat (saturated fat is solid- such as lard, butter, evans, coconut oil, palm oil (found in pastries)  2. Follow proper food proportions described in the \"ACP healthy plate\" for appropriate cholesterol intake. Limit high-cholesterol foods.  3. Consider including omega-3 rich foods such as salmon and flax seed powder in your diet.       "

## 2024-09-02 ENCOUNTER — OFFICE VISIT (OUTPATIENT)
Dept: URGENT CARE | Facility: PHYSICIAN GROUP | Age: 39
End: 2024-09-02
Payer: COMMERCIAL

## 2024-09-02 VITALS
BODY MASS INDEX: 29.77 KG/M2 | DIASTOLIC BLOOD PRESSURE: 80 MMHG | SYSTOLIC BLOOD PRESSURE: 130 MMHG | WEIGHT: 244.5 LBS | OXYGEN SATURATION: 97 % | HEIGHT: 76 IN | HEART RATE: 74 BPM | TEMPERATURE: 98.3 F | RESPIRATION RATE: 12 BRPM

## 2024-09-02 DIAGNOSIS — H61.22 IMPACTED CERUMEN OF LEFT EAR: ICD-10-CM

## 2024-09-02 PROCEDURE — 3075F SYST BP GE 130 - 139MM HG: CPT | Performed by: PHYSICIAN ASSISTANT

## 2024-09-02 PROCEDURE — 3079F DIAST BP 80-89 MM HG: CPT | Performed by: PHYSICIAN ASSISTANT

## 2024-09-02 PROCEDURE — 99212 OFFICE O/P EST SF 10 MIN: CPT | Performed by: PHYSICIAN ASSISTANT

## 2024-09-02 ASSESSMENT — ENCOUNTER SYMPTOMS
HEADACHES: 0
SORE THROAT: 0
EYE DISCHARGE: 0
EYE REDNESS: 0
COUGH: 0
FEVER: 0

## 2024-09-02 ASSESSMENT — FIBROSIS 4 INDEX: FIB4 SCORE: 0.96

## 2024-09-02 NOTE — PROGRESS NOTES
"Subjective     Anurag Lagunas is a 38 y.o. male who presents with Ear Fullness (L ear x 5 days)            This is a new problem.  The patient presents to clinic complaining of a plugged sensation to his left ear x 5 days with decreased hearing.  The patient reports no associated ear pain or discharge/drainage from his ear.  He also reports no fever.  No URI-like symptoms.  No cough.  No congestion.  No sore throat.  The patient has not taken any OTC medications for his current symptoms.    Ear Fullness  Pertinent negatives include no congestion, coughing, fever, headaches or sore throat. He has tried nothing for the symptoms.     PMH:  has a past medical history of Bronchitis, Hyperlipidemia, and Pneumonia.    He has no past medical history of Anesthesia.  MEDS:   Current Outpatient Medications:     Ascorbic Acid (VITAMIN C PO), Take 1 Tab by mouth as needed., Disp: , Rfl:     Cholecalciferol (VITAMIN D PO), Take  by mouth as needed., Disp: , Rfl:   ALLERGIES:   Allergies   Allergen Reactions    Ceftriaxone Sodium Hives    Rocephin [Ceftriaxone Sodium] Hives    Sulfa Drugs Rash     SURGHX:   Past Surgical History:   Procedure Laterality Date    UMBILICAL HERNIA REPAIR  2/28/2014    Performed by Dudley Alvarado M.D. at SURGERY [facility]     SOCHX:  reports that he has never smoked. He has quit using smokeless tobacco.  His smokeless tobacco use included chew. He reports current alcohol use of about 7.5 oz of alcohol per week. He reports that he does not use drugs.  FH: Family history was reviewed, no pertinent findings to report    Review of Systems   Constitutional:  Negative for fever.   HENT:  Negative for congestion, ear discharge, ear pain and sore throat.    Eyes:  Negative for discharge and redness.   Respiratory:  Negative for cough.    Neurological:  Negative for headaches.              Objective     /80   Pulse 74   Temp 36.8 °C (98.3 °F) (Temporal)   Resp 12   Ht 1.93 m (6' 4\")   Wt " 111 kg (244 lb 8 oz)   SpO2 97%   BMI 29.76 kg/m²      Physical Exam  Constitutional:       General: He is not in acute distress.     Appearance: Normal appearance. He is well-developed. He is not ill-appearing.   HENT:      Head: Normocephalic and atraumatic.      Right Ear: Tympanic membrane, ear canal and external ear normal.      Left Ear: External ear normal. There is impacted cerumen.      Ears:      Comments:   The patient has a small amount of nonobstructing cerumen to the right ear canal.  The patient has impacted cerumen to the left ear canal.   Eyes:      Extraocular Movements: Extraocular movements intact.      Conjunctiva/sclera: Conjunctivae normal.   Cardiovascular:      Rate and Rhythm: Normal rate.   Pulmonary:      Effort: Pulmonary effort is normal.   Musculoskeletal:         General: Normal range of motion.      Cervical back: Normal range of motion and neck supple.   Skin:     General: Skin is warm and dry.   Neurological:      Mental Status: He is alert and oriented to person, place, and time.                  Progress:  Cerumen Removal:  Successful removal of cerumen from the patient's bilateral ear canals via lavage.  On reexamination, the patient's bilateral TMs were clear without erythema or signs of infection.  The patient reports significant improvement of his symptoms following the cerumen removal.             Assessment & Plan        Assessment & Plan  Impacted cerumen of left ear            Differential diagnoses, supportive care, and indications for immediate follow-up discussed with patient.   Instructed to return to clinic or nearest emergency department for any change in condition, further concerns, or worsening of symptoms.    I personally reviewed prior external notes and test results pertinent to today's visit.  I have independently reviewed and interpreted all diagnostics ordered during this urgent care visit.     Please note that this dictation was created using voice  recognition software. I have made every reasonable attempt to correct obvious errors, but I expect that there may be errors of grammar and possibly content that I did not discover before finalizing the note.     This note was electronically signed by Tania Singh PA-C

## 2024-09-11 ENCOUNTER — OFFICE VISIT (OUTPATIENT)
Dept: UROLOGY | Facility: MEDICAL CENTER | Age: 39
End: 2024-09-11
Payer: COMMERCIAL

## 2024-09-11 DIAGNOSIS — N50.3 EPIDIDYMAL CYST: ICD-10-CM

## 2024-09-11 PROCEDURE — 99213 OFFICE O/P EST LOW 20 MIN: CPT | Performed by: STUDENT IN AN ORGANIZED HEALTH CARE EDUCATION/TRAINING PROGRAM

## 2024-09-11 NOTE — PROGRESS NOTES
Subjective  Anurag Lagunas is a 38 y.o. male who presents today for evaluation of a large right epididymal cyst. This has been present for awhile but recently started getting larger since January. He has no pain, difficulty with clothing or activities. He is not overly bothered by the cyst at this time but wanted to get evaluated. He works in construction.     Interval Updates:    9/11/2024: The cyst has gotten slightly larger since the last time he was seen. He is interested in proceeding with removal of the epididymal cyst.    Family History   Problem Relation Age of Onset    Thyroid Mother     Heart Disease Father         Hyperlipidemia/CAD    Hypertension Father     Cancer Maternal Uncle         Leukemia    Cancer Maternal Uncle     Breast Cancer Paternal Aunt     Cancer Maternal Grandfather     Cancer Paternal Grandfather     BRCA 1 Neg Hx     BRCA 2 Neg Hx     BRCA 1 Carrier  Neg Hx     BRCA 2 Carrier Neg Hx        Social History     Socioeconomic History    Marital status: Single     Spouse name: Not on file    Number of children: 0    Years of education: 18    Highest education level: Not on file   Occupational History    Occupation: Construction   Tobacco Use    Smoking status: Never    Smokeless tobacco: Former     Types: Chew    Tobacco comments:     cigars socially   Vaping Use    Vaping status: Never Used   Substance and Sexual Activity    Alcohol use: Yes     Alcohol/week: 7.5 oz     Types: 15 Standard drinks or equivalent per week     Comment: 6 drinks per week    Drug use: No    Sexual activity: Not Currently     Partners: Female   Other Topics Concern    Not on file   Social History Narrative    Not on file     Social Determinants of Health     Financial Resource Strain: Patient Declined (3/26/2024)    Overall Financial Resource Strain (CARDIA)     Difficulty of Paying Living Expenses: Patient declined   Food Insecurity: Patient Declined (3/26/2024)    Hunger Vital Sign     Worried About  Running Out of Food in the Last Year: Patient declined     Ran Out of Food in the Last Year: Patient declined   Transportation Needs: Patient Declined (3/26/2024)    PRAPARE - Transportation     Lack of Transportation (Medical): Patient declined     Lack of Transportation (Non-Medical): Patient declined   Physical Activity: Patient Declined (3/26/2024)    Exercise Vital Sign     Days of Exercise per Week: Patient declined     Minutes of Exercise per Session: Patient declined   Stress: Patient Declined (3/26/2024)    [employer] - Occupational Stress Questionnaire     Feeling of Stress : Patient declined   Social Connections: Patient Declined (3/26/2024)    Social Connection and Isolation Panel [NHANES]     Frequency of Communication with Friends and Family: Patient declined     Frequency of Social Gatherings with Friends and Family: Patient declined     Attends Church Services: Patient declined     Active Member of Clubs or Organizations: Patient declined     Attends Club or Organization Meetings: Patient declined     Marital Status: Patient declined   Intimate Partner Violence: Not on file   Housing Stability: Patient Declined (3/26/2024)    Housing Stability Vital Sign     Unable to Pay for Housing in the Last Year: Patient declined     Number of Places Lived in the Last Year: Not on file     Unstable Housing in the Last Year: Patient declined       Past Surgical History:   Procedure Laterality Date    UMBILICAL HERNIA REPAIR  2/28/2014    Performed by Dudley Alvarado M.D. at SURGERY [facility]       Past Medical History:   Diagnosis Date    Bronchitis     as a child    Hyperlipidemia     Pneumonia     as,a child       Current Outpatient Medications   Medication Sig Dispense Refill    Ascorbic Acid (VITAMIN C PO) Take 1 Tab by mouth as needed.      Cholecalciferol (VITAMIN D PO) Take  by mouth as needed.       No current facility-administered medications for this visit.        Allergies   Allergen Reactions    Ceftriaxone Sodium Hives    Rocephin [Ceftriaxone Sodium] Hives    Sulfa Drugs Rash       Objective  There were no vitals taken for this visit.  Physical Exam  Constitutional:       Appearance: Normal appearance. He is normal weight.   HENT:      Head: Normocephalic and atraumatic.   Pulmonary:      Effort: Pulmonary effort is normal.   Skin:     General: Skin is warm and dry.   Neurological:      General: No focal deficit present.      Mental Status: He is alert.   Psychiatric:         Mood and Affect: Mood normal.         Behavior: Behavior normal.         Labs: none    Imaging:   US SCROTAL   [id number] 04/01/2024    Narrative  4/1/2024 4:54 PM    HISTORY/REASON FOR EXAM: Swelling. Right scrotal swelling    TECHNIQUE/EXAM DESCRIPTION:  Real-time sonography of the scrotum was performed with gray-scale, color and duplex Doppler imaging.    COMPARISON: None    FINDINGS:    The right testis measures 5.51 cm x 2.88 cm x 3.50 cm,4.32 cm. Normal in size and echotexture. Normal vascularity on color Doppler. No intratesticular mass.    The left testis measures 5.25 cm x 2.28 cm x 3.48 cm. Normal in size and echotexture. Normal vascularity on color Doppler. No intratesticular mass.    Vascular flow is symmetric.    Contiguous with the right epididymal head and probably associated with the is a complicated cyst measuring 6.8 x 5.5 x 3.5 cm    No abnormal volume hydrocele is detected.    No varicocele is detected.    Impression  1.  Normal sonographic appearance of both testicle    2.  6.8 cm right epididymal head complicated cyst, likely a spermatocele      Assessment    Mr. Lagunas is a 38 year old gentleman with a large right epididymal cyst. Scrotal US and exam are reassuring for a benign cyst. He would like to proceed with removal at this time. Risks of surgery include bleeding, infection, damage to surrounding structures, obstruction of the epididymis/vas deferens,  possible infertility. As his left testicle and epididymis are completely normal his risk of infertility would be low. He would like to proceed when the projects slow down.    Plan    Problem List Items Addressed This Visit    None  Schedule for right epididymal cyst excision

## 2024-10-03 ENCOUNTER — HOSPITAL ENCOUNTER (OUTPATIENT)
Dept: LAB | Facility: MEDICAL CENTER | Age: 39
End: 2024-10-03
Attending: INTERNAL MEDICINE
Payer: COMMERCIAL

## 2024-10-03 DIAGNOSIS — R82.90 ABNORMAL URINALYSIS: ICD-10-CM

## 2024-10-03 DIAGNOSIS — E78.5 DYSLIPIDEMIA: ICD-10-CM

## 2024-10-03 DIAGNOSIS — E55.9 VITAMIN D DEFICIENCY: ICD-10-CM

## 2024-10-03 DIAGNOSIS — R79.9 ELEVATED BUN: ICD-10-CM

## 2024-10-03 LAB
APPEARANCE UR: CLEAR
BACTERIA #/AREA URNS HPF: ABNORMAL /HPF
BILIRUB UR QL STRIP.AUTO: NEGATIVE
COLOR UR: YELLOW
EPI CELLS #/AREA URNS HPF: ABNORMAL /HPF
GLUCOSE UR STRIP.AUTO-MCNC: NEGATIVE MG/DL
HYALINE CASTS #/AREA URNS LPF: ABNORMAL /LPF
KETONES UR STRIP.AUTO-MCNC: NEGATIVE MG/DL
LEUKOCYTE ESTERASE UR QL STRIP.AUTO: NEGATIVE
MICRO URNS: ABNORMAL
MUCOUS THREADS #/AREA URNS HPF: ABNORMAL /HPF
NITRITE UR QL STRIP.AUTO: NEGATIVE
PH UR STRIP.AUTO: 6 [PH] (ref 5–8)
PROT UR QL STRIP: NEGATIVE MG/DL
RBC # URNS HPF: ABNORMAL /HPF
RBC UR QL AUTO: ABNORMAL
SP GR UR STRIP.AUTO: >=1.03
UROBILINOGEN UR STRIP.AUTO-MCNC: 0.2 MG/DL
WBC #/AREA URNS HPF: ABNORMAL /HPF

## 2024-10-03 PROCEDURE — 80053 COMPREHEN METABOLIC PANEL: CPT

## 2024-10-03 PROCEDURE — 82306 VITAMIN D 25 HYDROXY: CPT

## 2024-10-03 PROCEDURE — 81001 URINALYSIS AUTO W/SCOPE: CPT

## 2024-10-03 PROCEDURE — 36415 COLL VENOUS BLD VENIPUNCTURE: CPT

## 2024-10-03 PROCEDURE — 86141 C-REACTIVE PROTEIN HS: CPT

## 2024-10-03 PROCEDURE — 83695 ASSAY OF LIPOPROTEIN(A): CPT

## 2024-10-03 PROCEDURE — 80061 LIPID PANEL: CPT

## 2024-10-04 LAB
25(OH)D3 SERPL-MCNC: 31 NG/ML (ref 30–100)
ALBUMIN SERPL BCP-MCNC: 4.7 G/DL (ref 3.2–4.9)
ALBUMIN/GLOB SERPL: 1.4 G/DL
ALP SERPL-CCNC: 57 U/L (ref 30–99)
ALT SERPL-CCNC: 46 U/L (ref 2–50)
ANION GAP SERPL CALC-SCNC: 11 MMOL/L (ref 7–16)
AST SERPL-CCNC: 35 U/L (ref 12–45)
BILIRUB SERPL-MCNC: 0.5 MG/DL (ref 0.1–1.5)
BUN SERPL-MCNC: 24 MG/DL (ref 8–22)
CALCIUM ALBUM COR SERPL-MCNC: 8.6 MG/DL (ref 8.5–10.5)
CALCIUM SERPL-MCNC: 9.2 MG/DL (ref 8.5–10.5)
CHLORIDE SERPL-SCNC: 101 MMOL/L (ref 96–112)
CHOLEST SERPL-MCNC: 245 MG/DL (ref 100–199)
CO2 SERPL-SCNC: 26 MMOL/L (ref 20–33)
CREAT SERPL-MCNC: 0.97 MG/DL (ref 0.5–1.4)
CRP SERPL HS-MCNC: 5 MG/L (ref 0–3)
FASTING STATUS PATIENT QL REPORTED: NORMAL
GFR SERPLBLD CREATININE-BSD FMLA CKD-EPI: 103 ML/MIN/1.73 M 2
GLOBULIN SER CALC-MCNC: 3.3 G/DL (ref 1.9–3.5)
GLUCOSE SERPL-MCNC: 111 MG/DL (ref 65–99)
HDLC SERPL-MCNC: 36 MG/DL
LDLC SERPL CALC-MCNC: 164 MG/DL
POTASSIUM SERPL-SCNC: 4.3 MMOL/L (ref 3.6–5.5)
PROT SERPL-MCNC: 8 G/DL (ref 6–8.2)
SODIUM SERPL-SCNC: 138 MMOL/L (ref 135–145)
TRIGL SERPL-MCNC: 227 MG/DL (ref 0–149)

## 2024-10-06 LAB — LPA SERPL-MCNC: 66 MG/DL

## 2024-10-08 ENCOUNTER — APPOINTMENT (OUTPATIENT)
Dept: ADMISSIONS | Facility: MEDICAL CENTER | Age: 39
End: 2024-10-08
Attending: STUDENT IN AN ORGANIZED HEALTH CARE EDUCATION/TRAINING PROGRAM
Payer: COMMERCIAL

## 2024-10-11 ENCOUNTER — APPOINTMENT (OUTPATIENT)
Dept: MEDICAL GROUP | Facility: IMAGING CENTER | Age: 39
End: 2024-10-11
Payer: COMMERCIAL

## 2024-10-11 VITALS
SYSTOLIC BLOOD PRESSURE: 132 MMHG | HEIGHT: 76 IN | DIASTOLIC BLOOD PRESSURE: 82 MMHG | TEMPERATURE: 98 F | WEIGHT: 251 LBS | RESPIRATION RATE: 16 BRPM | BODY MASS INDEX: 30.56 KG/M2 | HEART RATE: 73 BPM | OXYGEN SATURATION: 99 %

## 2024-10-11 DIAGNOSIS — R82.90 ABNORMAL URINALYSIS: ICD-10-CM

## 2024-10-11 DIAGNOSIS — E78.5 DYSLIPIDEMIA: ICD-10-CM

## 2024-10-11 DIAGNOSIS — R73.01 IMPAIRED FASTING GLUCOSE: ICD-10-CM

## 2024-10-11 PROCEDURE — 3075F SYST BP GE 130 - 139MM HG: CPT | Performed by: INTERNAL MEDICINE

## 2024-10-11 PROCEDURE — 3079F DIAST BP 80-89 MM HG: CPT | Performed by: INTERNAL MEDICINE

## 2024-10-11 PROCEDURE — 99214 OFFICE O/P EST MOD 30 MIN: CPT | Performed by: INTERNAL MEDICINE

## 2024-10-11 ASSESSMENT — FIBROSIS 4 INDEX: FIB4 SCORE: 0.88

## 2024-10-14 ENCOUNTER — PRE-ADMISSION TESTING (OUTPATIENT)
Dept: ADMISSIONS | Facility: MEDICAL CENTER | Age: 39
End: 2024-10-14
Attending: STUDENT IN AN ORGANIZED HEALTH CARE EDUCATION/TRAINING PROGRAM
Payer: COMMERCIAL

## 2024-10-14 ENCOUNTER — APPOINTMENT (RX ONLY)
Dept: URBAN - METROPOLITAN AREA CLINIC 15 | Facility: CLINIC | Age: 39
Setting detail: DERMATOLOGY
End: 2024-10-14

## 2024-10-14 DIAGNOSIS — L57.8 OTHER SKIN CHANGES DUE TO CHRONIC EXPOSURE TO NONIONIZING RADIATION: ICD-10-CM

## 2024-10-14 DIAGNOSIS — Z71.89 OTHER SPECIFIED COUNSELING: ICD-10-CM

## 2024-10-14 DIAGNOSIS — L81.4 OTHER MELANIN HYPERPIGMENTATION: ICD-10-CM

## 2024-10-14 DIAGNOSIS — D18.0 HEMANGIOMA: ICD-10-CM

## 2024-10-14 DIAGNOSIS — L57.0 ACTINIC KERATOSIS: ICD-10-CM

## 2024-10-14 DIAGNOSIS — D22 MELANOCYTIC NEVI: ICD-10-CM

## 2024-10-14 PROBLEM — D22.5 MELANOCYTIC NEVI OF TRUNK: Status: ACTIVE | Noted: 2024-10-14

## 2024-10-14 PROBLEM — D18.01 HEMANGIOMA OF SKIN AND SUBCUTANEOUS TISSUE: Status: ACTIVE | Noted: 2024-10-14

## 2024-10-14 PROCEDURE — 17000 DESTRUCT PREMALG LESION: CPT

## 2024-10-14 PROCEDURE — 99203 OFFICE O/P NEW LOW 30 MIN: CPT | Mod: 25

## 2024-10-14 PROCEDURE — ? COUNSELING

## 2024-10-14 PROCEDURE — ? ADDITIONAL NOTES

## 2024-10-14 PROCEDURE — ? LIQUID NITROGEN

## 2024-10-14 ASSESSMENT — LOCATION DETAILED DESCRIPTION DERM
LOCATION DETAILED: LEFT INFERIOR CENTRAL MALAR CHEEK
LOCATION DETAILED: RIGHT SUPERIOR MEDIAL UPPER BACK
LOCATION DETAILED: LEFT RADIAL DORSAL HAND
LOCATION DETAILED: RIGHT MID-UPPER BACK
LOCATION DETAILED: LEFT SUPERIOR MEDIAL UPPER BACK
LOCATION DETAILED: RIGHT RADIAL DORSAL HAND
LOCATION DETAILED: LEFT SUPERIOR CENTRAL MALAR CHEEK

## 2024-10-14 ASSESSMENT — LOCATION SIMPLE DESCRIPTION DERM
LOCATION SIMPLE: LEFT UPPER BACK
LOCATION SIMPLE: LEFT CHEEK
LOCATION SIMPLE: LEFT HAND
LOCATION SIMPLE: RIGHT HAND
LOCATION SIMPLE: RIGHT UPPER BACK

## 2024-10-14 ASSESSMENT — LOCATION ZONE DERM
LOCATION ZONE: TRUNK
LOCATION ZONE: FACE
LOCATION ZONE: HAND

## 2024-10-14 NOTE — PROCEDURE: ADDITIONAL NOTES
Additional Notes: recheck in 3mo.  Can't completely r/o bcc, but doubtful.  if doesn't resolve, consider bx.
Render Risk Assessment In Note?: no
Detail Level: Detailed

## 2024-10-16 ENCOUNTER — TELEPHONE (OUTPATIENT)
Dept: HEALTH INFORMATION MANAGEMENT | Facility: OTHER | Age: 39
End: 2024-10-16
Payer: COMMERCIAL

## 2024-11-07 ENCOUNTER — TELEPHONE (OUTPATIENT)
Dept: UROLOGY | Facility: MEDICAL CENTER | Age: 39
End: 2024-11-07
Payer: COMMERCIAL

## 2024-11-07 NOTE — OR NURSING
Preadmit:  Call to patient to encourage increased oral fluid intake the day prior to procedure/surgery including intake of electrolyte drinks such as Gatorade or electrolyte water.  Patient may have clear liquids 2 hours prior to surgery/procedure.  If patient is on any type of fluid restriction, patient to follow doctor instructions instead.  Surgery/procedure date 11/8/2024.

## 2024-11-08 ENCOUNTER — ANESTHESIA (OUTPATIENT)
Dept: SURGERY | Facility: MEDICAL CENTER | Age: 39
End: 2024-11-08
Payer: COMMERCIAL

## 2024-11-08 ENCOUNTER — HOSPITAL ENCOUNTER (OUTPATIENT)
Facility: MEDICAL CENTER | Age: 39
End: 2024-11-08
Attending: STUDENT IN AN ORGANIZED HEALTH CARE EDUCATION/TRAINING PROGRAM | Admitting: STUDENT IN AN ORGANIZED HEALTH CARE EDUCATION/TRAINING PROGRAM
Payer: COMMERCIAL

## 2024-11-08 ENCOUNTER — ANESTHESIA EVENT (OUTPATIENT)
Dept: SURGERY | Facility: MEDICAL CENTER | Age: 39
End: 2024-11-08
Payer: COMMERCIAL

## 2024-11-08 ENCOUNTER — PHARMACY VISIT (OUTPATIENT)
Dept: PHARMACY | Facility: MEDICAL CENTER | Age: 39
End: 2024-11-08
Payer: COMMERCIAL

## 2024-11-08 VITALS
DIASTOLIC BLOOD PRESSURE: 75 MMHG | RESPIRATION RATE: 16 BRPM | OXYGEN SATURATION: 93 % | HEART RATE: 72 BPM | WEIGHT: 246.25 LBS | SYSTOLIC BLOOD PRESSURE: 123 MMHG | HEIGHT: 76 IN | BODY MASS INDEX: 29.99 KG/M2 | TEMPERATURE: 96.8 F

## 2024-11-08 DIAGNOSIS — N50.89 SWELLING OF RIGHT HALF OF SCROTUM: ICD-10-CM

## 2024-11-08 LAB — PATHOLOGY CONSULT NOTE: NORMAL

## 2024-11-08 PROCEDURE — RXMED WILLOW AMBULATORY MEDICATION CHARGE: Performed by: STUDENT IN AN ORGANIZED HEALTH CARE EDUCATION/TRAINING PROGRAM

## 2024-11-08 PROCEDURE — 88304 TISSUE EXAM BY PATHOLOGIST: CPT

## 2024-11-08 PROCEDURE — 54840 REMOVE EPIDIDYMIS LESION: CPT | Mod: RT | Performed by: STUDENT IN AN ORGANIZED HEALTH CARE EDUCATION/TRAINING PROGRAM

## 2024-11-08 PROCEDURE — 160025 RECOVERY II MINUTES (STATS): Performed by: STUDENT IN AN ORGANIZED HEALTH CARE EDUCATION/TRAINING PROGRAM

## 2024-11-08 PROCEDURE — 160035 HCHG PACU - 1ST 60 MINS PHASE I: Performed by: STUDENT IN AN ORGANIZED HEALTH CARE EDUCATION/TRAINING PROGRAM

## 2024-11-08 PROCEDURE — 160009 HCHG ANES TIME/MIN: Performed by: STUDENT IN AN ORGANIZED HEALTH CARE EDUCATION/TRAINING PROGRAM

## 2024-11-08 PROCEDURE — 700105 HCHG RX REV CODE 258: Performed by: ANESTHESIOLOGY

## 2024-11-08 PROCEDURE — 700111 HCHG RX REV CODE 636 W/ 250 OVERRIDE (IP): Mod: JZ | Performed by: ANESTHESIOLOGY

## 2024-11-08 PROCEDURE — 700111 HCHG RX REV CODE 636 W/ 250 OVERRIDE (IP): Performed by: STUDENT IN AN ORGANIZED HEALTH CARE EDUCATION/TRAINING PROGRAM

## 2024-11-08 PROCEDURE — A9270 NON-COVERED ITEM OR SERVICE: HCPCS | Performed by: ANESTHESIOLOGY

## 2024-11-08 PROCEDURE — 160041 HCHG SURGERY MINUTES - EA ADDL 1 MIN LEVEL 4: Performed by: STUDENT IN AN ORGANIZED HEALTH CARE EDUCATION/TRAINING PROGRAM

## 2024-11-08 PROCEDURE — 700101 HCHG RX REV CODE 250: Performed by: ANESTHESIOLOGY

## 2024-11-08 PROCEDURE — 160029 HCHG SURGERY MINUTES - 1ST 30 MINS LEVEL 4: Performed by: STUDENT IN AN ORGANIZED HEALTH CARE EDUCATION/TRAINING PROGRAM

## 2024-11-08 PROCEDURE — 160046 HCHG PACU - 1ST 60 MINS PHASE II: Performed by: STUDENT IN AN ORGANIZED HEALTH CARE EDUCATION/TRAINING PROGRAM

## 2024-11-08 PROCEDURE — 160048 HCHG OR STATISTICAL LEVEL 1-5: Performed by: STUDENT IN AN ORGANIZED HEALTH CARE EDUCATION/TRAINING PROGRAM

## 2024-11-08 PROCEDURE — 160002 HCHG RECOVERY MINUTES (STAT): Performed by: STUDENT IN AN ORGANIZED HEALTH CARE EDUCATION/TRAINING PROGRAM

## 2024-11-08 PROCEDURE — 160036 HCHG PACU - EA ADDL 30 MINS PHASE I: Performed by: STUDENT IN AN ORGANIZED HEALTH CARE EDUCATION/TRAINING PROGRAM

## 2024-11-08 PROCEDURE — 700102 HCHG RX REV CODE 250 W/ 637 OVERRIDE(OP): Performed by: ANESTHESIOLOGY

## 2024-11-08 PROCEDURE — C1729 CATH, DRAINAGE: HCPCS | Performed by: STUDENT IN AN ORGANIZED HEALTH CARE EDUCATION/TRAINING PROGRAM

## 2024-11-08 RX ORDER — ACETAMINOPHEN 500 MG
1000 TABLET ORAL ONCE
Status: COMPLETED | OUTPATIENT
Start: 2024-11-08 | End: 2024-11-08

## 2024-11-08 RX ORDER — POLYETHYLENE GLYCOL 3350 17 G/17G
17 POWDER, FOR SOLUTION ORAL DAILY
Qty: 119 G | Refills: 0 | Status: SHIPPED | OUTPATIENT
Start: 2024-11-08 | End: 2024-11-15

## 2024-11-08 RX ORDER — MIDAZOLAM HYDROCHLORIDE 1 MG/ML
INJECTION INTRAMUSCULAR; INTRAVENOUS PRN
Status: DISCONTINUED | OUTPATIENT
Start: 2024-11-08 | End: 2024-11-08 | Stop reason: SURG

## 2024-11-08 RX ORDER — OXYCODONE HCL 5 MG/5 ML
10 SOLUTION, ORAL ORAL
Status: COMPLETED | OUTPATIENT
Start: 2024-11-08 | End: 2024-11-08

## 2024-11-08 RX ORDER — DEXAMETHASONE SODIUM PHOSPHATE 4 MG/ML
INJECTION, SOLUTION INTRA-ARTICULAR; INTRALESIONAL; INTRAMUSCULAR; INTRAVENOUS; SOFT TISSUE PRN
Status: DISCONTINUED | OUTPATIENT
Start: 2024-11-08 | End: 2024-11-08 | Stop reason: SURG

## 2024-11-08 RX ORDER — BUPIVACAINE HYDROCHLORIDE 2.5 MG/ML
INJECTION, SOLUTION EPIDURAL; INFILTRATION; INTRACAUDAL
Status: DISCONTINUED | OUTPATIENT
Start: 2024-11-08 | End: 2024-11-08 | Stop reason: HOSPADM

## 2024-11-08 RX ORDER — LIDOCAINE HYDROCHLORIDE 20 MG/ML
INJECTION, SOLUTION EPIDURAL; INFILTRATION; INTRACAUDAL; PERINEURAL PRN
Status: DISCONTINUED | OUTPATIENT
Start: 2024-11-08 | End: 2024-11-08 | Stop reason: SURG

## 2024-11-08 RX ORDER — SODIUM CHLORIDE, SODIUM LACTATE, POTASSIUM CHLORIDE, CALCIUM CHLORIDE 600; 310; 30; 20 MG/100ML; MG/100ML; MG/100ML; MG/100ML
INJECTION, SOLUTION INTRAVENOUS CONTINUOUS
Status: DISCONTINUED | OUTPATIENT
Start: 2024-11-08 | End: 2024-11-08 | Stop reason: HOSPADM

## 2024-11-08 RX ORDER — KETOROLAC TROMETHAMINE 15 MG/ML
INJECTION, SOLUTION INTRAMUSCULAR; INTRAVENOUS PRN
Status: DISCONTINUED | OUTPATIENT
Start: 2024-11-08 | End: 2024-11-08 | Stop reason: SURG

## 2024-11-08 RX ORDER — ONDANSETRON 2 MG/ML
INJECTION INTRAMUSCULAR; INTRAVENOUS PRN
Status: DISCONTINUED | OUTPATIENT
Start: 2024-11-08 | End: 2024-11-08 | Stop reason: SURG

## 2024-11-08 RX ORDER — SODIUM CHLORIDE 9 MG/ML
INJECTION, SOLUTION INTRAVENOUS CONTINUOUS
Status: DISCONTINUED | OUTPATIENT
Start: 2024-11-08 | End: 2024-11-08 | Stop reason: HOSPADM

## 2024-11-08 RX ORDER — SODIUM CHLORIDE, SODIUM LACTATE, POTASSIUM CHLORIDE, CALCIUM CHLORIDE 600; 310; 30; 20 MG/100ML; MG/100ML; MG/100ML; MG/100ML
INJECTION, SOLUTION INTRAVENOUS
Status: DISCONTINUED | OUTPATIENT
Start: 2024-11-08 | End: 2024-11-08 | Stop reason: SURG

## 2024-11-08 RX ORDER — HYDROMORPHONE HYDROCHLORIDE 1 MG/ML
0.2 INJECTION, SOLUTION INTRAMUSCULAR; INTRAVENOUS; SUBCUTANEOUS
Status: DISCONTINUED | OUTPATIENT
Start: 2024-11-08 | End: 2024-11-08 | Stop reason: HOSPADM

## 2024-11-08 RX ORDER — HALOPERIDOL 5 MG/ML
1 INJECTION INTRAMUSCULAR
Status: DISCONTINUED | OUTPATIENT
Start: 2024-11-08 | End: 2024-11-08 | Stop reason: HOSPADM

## 2024-11-08 RX ORDER — OXYCODONE HCL 5 MG/5 ML
5 SOLUTION, ORAL ORAL
Status: COMPLETED | OUTPATIENT
Start: 2024-11-08 | End: 2024-11-08

## 2024-11-08 RX ORDER — HYDROMORPHONE HYDROCHLORIDE 1 MG/ML
0.1 INJECTION, SOLUTION INTRAMUSCULAR; INTRAVENOUS; SUBCUTANEOUS
Status: DISCONTINUED | OUTPATIENT
Start: 2024-11-08 | End: 2024-11-08 | Stop reason: HOSPADM

## 2024-11-08 RX ORDER — MEPERIDINE HYDROCHLORIDE 25 MG/ML
12.5 INJECTION INTRAMUSCULAR; INTRAVENOUS; SUBCUTANEOUS
Status: DISCONTINUED | OUTPATIENT
Start: 2024-11-08 | End: 2024-11-08 | Stop reason: HOSPADM

## 2024-11-08 RX ORDER — HYDROMORPHONE HYDROCHLORIDE 1 MG/ML
0.4 INJECTION, SOLUTION INTRAMUSCULAR; INTRAVENOUS; SUBCUTANEOUS
Status: DISCONTINUED | OUTPATIENT
Start: 2024-11-08 | End: 2024-11-08 | Stop reason: HOSPADM

## 2024-11-08 RX ORDER — ONDANSETRON 2 MG/ML
4 INJECTION INTRAMUSCULAR; INTRAVENOUS
Status: DISCONTINUED | OUTPATIENT
Start: 2024-11-08 | End: 2024-11-08 | Stop reason: HOSPADM

## 2024-11-08 RX ORDER — OXYCODONE HYDROCHLORIDE 5 MG/1
5 TABLET ORAL EVERY 4 HOURS PRN
Qty: 12 TABLET | Refills: 0 | Status: SHIPPED | OUTPATIENT
Start: 2024-11-08 | End: 2024-11-11

## 2024-11-08 RX ADMIN — FENTANYL CITRATE 25 MCG: 50 INJECTION, SOLUTION INTRAMUSCULAR; INTRAVENOUS at 10:17

## 2024-11-08 RX ADMIN — LIDOCAINE HYDROCHLORIDE 100 MG: 20 INJECTION, SOLUTION EPIDURAL; INFILTRATION; INTRACAUDAL at 08:02

## 2024-11-08 RX ADMIN — MIDAZOLAM HYDROCHLORIDE 2 MG: 2 INJECTION, SOLUTION INTRAMUSCULAR; INTRAVENOUS at 07:57

## 2024-11-08 RX ADMIN — OXYCODONE HYDROCHLORIDE 5 MG: 5 SOLUTION ORAL at 09:56

## 2024-11-08 RX ADMIN — FENTANYL CITRATE 25 MCG: 50 INJECTION, SOLUTION INTRAMUSCULAR; INTRAVENOUS at 08:18

## 2024-11-08 RX ADMIN — CLINDAMYCIN PHOSPHATE 900 MG: 150 INJECTION, SOLUTION INTRAMUSCULAR; INTRAVENOUS at 08:02

## 2024-11-08 RX ADMIN — FENTANYL CITRATE 50 MCG: 50 INJECTION, SOLUTION INTRAMUSCULAR; INTRAVENOUS at 08:02

## 2024-11-08 RX ADMIN — FENTANYL CITRATE 25 MCG: 50 INJECTION, SOLUTION INTRAMUSCULAR; INTRAVENOUS at 08:45

## 2024-11-08 RX ADMIN — ONDANSETRON 4 MG: 2 INJECTION INTRAMUSCULAR; INTRAVENOUS at 09:14

## 2024-11-08 RX ADMIN — PROPOFOL 200 MG: 10 INJECTION, EMULSION INTRAVENOUS at 08:02

## 2024-11-08 RX ADMIN — DEXAMETHASONE SODIUM PHOSPHATE 8 MG: 4 INJECTION INTRA-ARTICULAR; INTRALESIONAL; INTRAMUSCULAR; INTRAVENOUS; SOFT TISSUE at 08:10

## 2024-11-08 RX ADMIN — SODIUM CHLORIDE, POTASSIUM CHLORIDE, SODIUM LACTATE AND CALCIUM CHLORIDE: 600; 310; 30; 20 INJECTION, SOLUTION INTRAVENOUS at 07:57

## 2024-11-08 RX ADMIN — KETOROLAC TROMETHAMINE 30 MG: 15 INJECTION, SOLUTION INTRAMUSCULAR; INTRAVENOUS at 09:14

## 2024-11-08 RX ADMIN — ACETAMINOPHEN 1000 MG: 500 TABLET ORAL at 06:28

## 2024-11-08 ASSESSMENT — PAIN DESCRIPTION - PAIN TYPE
TYPE: SURGICAL PAIN

## 2024-11-08 ASSESSMENT — FIBROSIS 4 INDEX: FIB4 SCORE: 0.9

## 2024-11-08 ASSESSMENT — PAIN SCALES - GENERAL: PAIN_LEVEL: 2

## 2024-11-08 NOTE — PROGRESS NOTES
Medication history reviewed with PT at bedside    Med rec is complete per PT reporting    Allergies reviewed.     Patient denies any outpatient antibiotics in the last 30 days.     Patient is not taking anticoagulants.    Preferred pharmacy for this visit - [facility] ([phone])

## 2024-11-08 NOTE — DISCHARGE INSTRUCTIONS
HOME CARE INSTRUCTIONS    ACTIVITY: Rest and take it easy for the first 24 hours.  A responsible adult is recommended to remain with you during that time.  It is normal to feel sleepy.  We encourage you to not do anything that requires balance, judgment or coordination.    FOR 24 HOURS DO NOT:  Drive, operate machinery or run household appliances.  Drink beer or alcoholic beverages.  Make important decisions or sign legal documents.    SPECIAL INSTRUCTIONS:   Scrotal support for comfort.    DIET: To avoid nausea, slowly advance diet as tolerated, avoiding spicy or greasy foods for the first day.  Add more substantial food to your diet according to your physician's instructions.  Babies can be fed formula or breast milk as soon as they are hungry.  INCREASE FLUIDS AND FIBER TO AVOID CONSTIPATION.    SURGICAL DRESSING/BATHING:   Okay to shower.  No baths/soaking in water until cleared by   Surgical glue in place and will fall off on its own.    MEDICATIONS: Resume taking daily medication.  Take prescribed pain medication with food.  If no medication is prescribed, you may take non-aspirin pain medication if needed.  PAIN MEDICATION CAN BE VERY CONSTIPATING.  Take a stool softener or laxative such as senokot, pericolace, or milk of magnesia if needed.    Prescription given for Miralax and Roxicodone.  Last pain medication given at 09:56 am.    A follow-up appointment should be arranged with your doctor in 1-2 weeks; call to schedule.    You should CALL YOUR PHYSICIAN if you develop:  Fever greater than 101 degrees F.  Pain not relieved by medication, or persistent nausea or vomiting.  Excessive bleeding (blood soaking through dressing) or unexpected drainage from the wound.  Extreme redness or swelling around the incision site, drainage of pus or foul smelling drainage.  Inability to urinate or empty your bladder within 8 hours.  Problems with breathing or chest pain.    You should call 911 if you develop problems  with breathing or chest pain.  If you are unable to contact your doctor or surgical center, you should go to the nearest emergency room or urgent care center.  Physician's telephone #: Dr. Rico [phone]    MILD FLU-LIKE SYMPTOMS ARE NORMAL.  YOU MAY EXPERIENCE GENERALIZED MUSCLE ACHES, THROAT IRRITATION, HEADACHE AND/OR SOME NAUSEA.    If any questions arise, call your doctor.  If your doctor is not available, please feel free to call the Surgical Center at [phone].  The Center is open Monday through Friday from 7AM to 7PM.      A registered nurse may call you a few days after your surgery to see how you are doing after your procedure.    You may also receive a survey in the mail within the next two weeks and we ask that you take a few moments to complete the survey and return it to us.  Our goal is to provide you with very good care and we value your comments.     Depression / Suicide Risk    As you are discharged from this [facility] facility, it is important to learn how to keep safe from harming yourself.    Recognize the warning signs:  Abrupt changes in personality, positive or negative- including increase in energy   Giving away possessions  Change in eating patterns- significant weight changes-  positive or negative  Change in sleeping patterns- unable to sleep or sleeping all the time   Unwillingness or inability to communicate  Depression  Unusual sadness, discouragement and loneliness  Talk of wanting to die  Neglect of personal appearance   Rebelliousness- reckless behavior  Withdrawal from people/activities they love  Confusion- inability to concentrate     If you or a loved one observes any of these behaviors or has concerns about self-harm, here's what you can do:  Talk about it- your feelings and reasons for harming yourself  Remove any means that you might use to hurt yourself (examples: pills, rope, extension cords, firearm)  Get professional help from the community (Mental Health,  Substance Abuse, psychological counseling)  Do not be alone:Call your Safe Contact- someone whom you trust who will be there for you.  Call your local CRISIS HOTLINE 574-0301 or 249-065-4967  Call your local Children's Mobile Crisis Response Team [place] (277) 577-0006 or www.[employer]  Call the toll free National Suicide Prevention Hotlines   National Suicide Prevention Lifeline 837-012-VMMP (9427)  [facility] Line Network 800-SUICIDE (791-4326)    I acknowledge receipt and understanding of these Home Care instructions.

## 2024-11-08 NOTE — ANESTHESIA TIME REPORT
Anesthesia Start and Stop Event Times       Date Time Event    11/8/2024 0741 Ready for Procedure     0757 Anesthesia Start     0933 Anesthesia Stop          Responsible Staff  11/08/24      Name Role Begin End    Rand Weeks M.D. Anesth [id number]          Overtime Reason:  no overtime (within assigned shift)    Comments:

## 2024-11-08 NOTE — OP REPORT
SURGEON: Dr. Delmar Rico      ANESTHESIA: General (general endotracheal tube)      PRE-OPERATIVE DIAGNOSIS: right epididymal cyst    POST-OPERATIVE DIAGNOSIS: Same      NAME OF PROCEDURE: Rigth epididymal cyst excision    FINDINGS OF PROCEDURE: large right epididymal cyst. Good hemostasis at conclusion of case.      EBL: 5 cc      COMPLICATIONS: None      PATIENT CONDITION: stable      SPECIMEN: right epididymal cyst    INDICATIONS: Anurag Lagunas is a 39 y.o. male who agreed to above procedure for further management after complete discussion of risks, benefits, and alternatives. He understands the main risks to be bleeding, infection, epididymal obstruction, impaired fertility.     PROCEDURE:      After informed consent was obtained in the preoperative care unit, the patient was taken to the OR on a stretcher. The patient was properly identified and placed in supine position per OR protocol. The patient was given a prophylactic dose of  Clindamycin . General (general endotracheal tube) was administered.  The genitalia were then shaved, prepped, and draped in the usual sterile fashion. A timeout was performed with all parties in agreement.     Local anesthetic was injected along the planned scrotal raphe incision. A 15 blade was used to make my initial incision. I used electrocautery to dissect down through dartos to the level of the tunica vaginalis. The testicle was then delivered out of the scrotum. I incised the tunica vaginalis and identified the large epididymal cyst. This was dissected off the testicle and away from the normal epididymis with a combination of blunt dissection, electrocautery and sharp dissection until the stalk of the cyst was identified. This was ligated with a 3-0 Vicryl and the cyst was then removed. The dartos layer was closed with a running 3-0 Vicryl suture. The skin was closed with a running 4-0 Monocryl subcuticular stitch. A total of 5 mL of 1% lidocaine plain was injected for  local anesthesia. I then covered the incisions with Dermabond and placed scrotal fluffs with a scrotal support. The patient tolerated the procedure well, he was transported to the PACU in stable condition.      DISPOSITION: The patient will be discharged home with plan for follow-up in 2 weeks.

## 2024-11-08 NOTE — OR NURSING
Arrived from PACU AXO, Pt's VSS; denies N/V; states pain is at tolerable level. Incision with dermabond to scrotum CDI.     @1111 D/c orders received. IV dc'd. Pt changed into clothing with assistance. Discharge reviewed, Pt and family verbalized understanding and questions answered. Patient states ready to d/c home. Pt dc'd in w/c with all belongings.

## 2024-11-08 NOTE — ANESTHESIA PREPROCEDURE EVALUATION
Case: [id number] Date/Time: 11/08/24 0745    Procedure: RIGHT EPIDIDYMAL CYST EXCISION    Pre-op diagnosis: EPIDIDYMAL CYST    Location: [facility] OR 12 / SURGERY [facility]    Surgeons: Delmar Rico M.D.          39 yw/right epididymal cyst    Relevant Problems   ANESTHESIA (within normal limits)     Healthy    Physical Exam    Airway   Mallampati: II  TM distance: >3 FB  Neck ROM: full       Cardiovascular   Rhythm: regular  Rate: normal  (-) murmur     Dental - normal exam           Pulmonary   Breath sounds clear to auscultation     Abdominal    Neurological - normal exam                   Anesthesia Plan    ASA 1       Plan - general       Airway plan will be LMA          Induction: intravenous    Postoperative Plan: Postoperative administration of opioids is intended.    Pertinent diagnostic labs and testing reviewed    Informed Consent:    Anesthetic plan and risks discussed with patient.    Use of blood products discussed with: patient whom consented to blood products.

## 2024-11-08 NOTE — ANESTHESIA PROCEDURE NOTES
Airway    Date/Time: 11/8/2024 8:04 AM    Performed by: Rand Weeks M.D.  Authorized by: Rand Weeks M.D.    Location:  OR  Urgency:  Elective  Indications for Airway Management:  Anesthesia      Spontaneous Ventilation: absent    Sedation Level:  Deep  Preoxygenated: Yes    Mask Difficulty Assessment:  0 - not attempted  Final Airway Type:  Supraglottic airway  Final Supraglottic Airway:  Standard LMA    SGA Size:  5  Number of Attempts at Approach:  1

## 2024-11-08 NOTE — ANESTHESIA POSTPROCEDURE EVALUATION
Patient: Anurag Lagunas    Procedure Summary       Date: 11/08/24 Room / Location: [place] [address] / SURGERY [facility]    Anesthesia Start: 0757 Anesthesia Stop: 0933    Procedure: RIGHT EPIDIDYMAL CYST EXCISION (Right: Scrotum) Diagnosis: (RIGHT EPIDIDYMAL CYST)    Surgeons: Delmar Rico M.D. Responsible Provider: Rand Weeks M.D.    Anesthesia Type: general ASA Status: 1            Final Anesthesia Type: general  Last vitals  BP   Blood Pressure: 122/70    Temp   36.2 °C (97.2 °F)    Pulse   69   Resp   (!) 22    SpO2   98 %      Anesthesia Post Evaluation    Patient location during evaluation: PACU  Patient participation: complete - patient participated  Level of consciousness: awake  Pain score: 2    Airway patency: patent  Anesthetic complications: no  Cardiovascular status: hemodynamically stable  Respiratory status: acceptable  Hydration status: acceptable    PONV: none          No notable events documented.     Nurse Pain Score: 0 (NPRS)

## 2024-11-13 ENCOUNTER — APPOINTMENT (OUTPATIENT)
Dept: UROLOGY | Facility: MEDICAL CENTER | Age: 39
End: 2024-11-13
Payer: COMMERCIAL

## 2024-11-20 ENCOUNTER — OFFICE VISIT (OUTPATIENT)
Dept: UROLOGY | Facility: MEDICAL CENTER | Age: 39
End: 2024-11-20
Payer: COMMERCIAL

## 2024-11-20 DIAGNOSIS — N50.3 EPIDIDYMAL CYST: ICD-10-CM

## 2024-11-20 PROCEDURE — 99024 POSTOP FOLLOW-UP VISIT: CPT | Performed by: STUDENT IN AN ORGANIZED HEALTH CARE EDUCATION/TRAINING PROGRAM

## 2024-11-20 NOTE — PROGRESS NOTES
Subjective  Anurag Lagunas is a 39 y.o. male who presents today for post-operative follow up.     Procedure: right epididymal cyst excision     Procedure date: 11/8/2024     Post-op course: He is recovering well, no pain at this time. Minimal to no swelling. He has been taking it easy.        Family History   Problem Relation Age of Onset    Thyroid Mother     Heart Disease Father         Hyperlipidemia/CAD    Hypertension Father     Cancer Maternal Uncle         Leukemia    Cancer Maternal Uncle     Breast Cancer Paternal Aunt     Cancer Maternal Grandfather     Cancer Paternal Grandfather     BRCA 1 Neg Hx     BRCA 2 Neg Hx     BRCA 1 Carrier  Neg Hx     BRCA 2 Carrier Neg Hx        Social History     Socioeconomic History    Marital status: Single     Spouse name: Not on file    Number of children: 0    Years of education: 18    Highest education level: Not on file   Occupational History    Occupation: Construction   Tobacco Use    Smoking status: Never    Smokeless tobacco: Former     Types: Chew    Tobacco comments:         Vaping Use    Vaping status: Never Used   Substance and Sexual Activity    Alcohol use: Yes     Alcohol/week: 3.6 oz     Types: 6 Standard drinks or equivalent per week     Comment: 6 per week    Drug use: No    Sexual activity: Not Currently     Partners: Female   Other Topics Concern    Not on file   Social History Narrative    Not on file     Social Drivers of Health     Financial Resource Strain: Patient Declined (3/26/2024)    Overall Financial Resource Strain (CARDIA)     Difficulty of Paying Living Expenses: Patient declined   Food Insecurity: Patient Declined (3/26/2024)    Hunger Vital Sign     Worried About Running Out of Food in the Last Year: Patient declined     Ran Out of Food in the Last Year: Patient declined   Transportation Needs: Patient Declined (3/26/2024)    PRAPARE - Transportation     Lack of Transportation (Medical): Patient declined     Lack of Transportation  (Non-Medical): Patient declined   Physical Activity: Patient Declined (3/26/2024)    Exercise Vital Sign     Days of Exercise per Week: Patient declined     Minutes of Exercise per Session: Patient declined   Stress: Patient Declined (3/26/2024)    [place] of Occupational Health - Occupational Stress Questionnaire     Feeling of Stress : Patient declined   Social Connections: Patient Declined (3/26/2024)    Social Connection and Isolation Panel [NHANES]     Frequency of Communication with Friends and Family: Patient declined     Frequency of Social Gatherings with Friends and Family: Patient declined     Attends Yazidism Services: Patient declined     Active Member of Clubs or Organizations: Patient declined     Attends Club or Organization Meetings: Patient declined     Marital Status: Patient declined   Intimate Partner Violence: Not on file   Housing Stability: Patient Declined (3/26/2024)    Housing Stability Vital Sign     Unable to Pay for Housing in the Last Year: Patient declined     Number of Places Lived in the Last Year: Not on file     Unstable Housing in the Last Year: Patient declined       Past Surgical History:   Procedure Laterality Date    AL EXCIS EPIDIDYMIS LOCAL LESION Right 11/8/2024    Procedure: RIGHT EPIDIDYMAL CYST EXCISION;  Surgeon: Delmar Rico M.D.;  Location: [facility];  Service: Urology    UMBILICAL HERNIA REPAIR  02/28/2014    Performed by Dudley Alvarado M.D. at SURGERY [facility] ORS    OTHER      dental implant       Past Medical History:   Diagnosis Date    Bronchitis     as a child    High cholesterol 2024    no medication    Hyperlipidemia     Pneumonia     as,a child       Current Outpatient Medications   Medication Sig Dispense Refill    Ascorbic Acid (VITAMIN C PO) Take 1 Tablet by mouth every evening.        Omega-3 Fatty Acids (FISH OIL PO) Take 1 Tablet by mouth 2 times a day.        Cholecalciferol (VITAMIN D PO) Take 1 Tablet by mouth every  evening.          No current facility-administered medications for this visit.       Allergies   Allergen Reactions    Rocephin [Ceftriaxone Sodium] Hives    Sulfa Drugs Rash     Per PT       Objective  There were no vitals taken for this visit.  Physical Exam  Constitutional:       Appearance: Normal appearance.   HENT:      Head: Normocephalic and atraumatic.   Pulmonary:      Effort: Pulmonary effort is normal.   Genitourinary:     Comments: Minimal swelling on the right. Right hemiscrotum mildly tender to palpation  Musculoskeletal:      Comments: Incision c/d/I healing well   Skin:     General: Skin is warm and dry.   Neurological:      General: No focal deficit present.      Mental Status: He is alert.   Psychiatric:         Mood and Affect: Mood normal.         Behavior: Behavior normal.         Labs:      SURGICAL PATHOLOGY CONSULTATION       FINAL DIAGNOSIS:     A. Right epididymal cyst:          Segment of epididymis with a cyst-like structure lined by benign           cuboidal epithelium with associated spermatozoa, consistent           with spermatocele.          No atypical or malignant features identified.     Imaging: none    Assessment    Mr. Lagunas is a 39 year old gentleman s/p right epididymal cyst excision on 11/8/2024. He is recovering well with no pain and minimal swelling. I recommend continuing with lifting restrictions for two more weeks at which point he can resume all normal activities. We discussed that there is a risk it could come back, however, that is a low likelihood.     Plan    1. Epididymal cyst  Dictation #1  MRN:[id number]  [facility]:[id number]     RTC 2 months  Continue lifting restrictions for 2 weeks

## 2025-01-08 ENCOUNTER — OFFICE VISIT (OUTPATIENT)
Dept: UROLOGY | Facility: MEDICAL CENTER | Age: 40
End: 2025-01-08
Payer: COMMERCIAL

## 2025-01-08 DIAGNOSIS — N50.3 EPIDIDYMAL CYST: ICD-10-CM

## 2025-01-08 PROCEDURE — 99024 POSTOP FOLLOW-UP VISIT: CPT | Performed by: STUDENT IN AN ORGANIZED HEALTH CARE EDUCATION/TRAINING PROGRAM

## 2025-01-08 NOTE — PROGRESS NOTES
Subjective  Anurag Lagunas is a 39 y.o. male who presents today for post-operative follow up.     Procedure: right epididymal cyst excision     Procedure date: 11/8/2024     Post-op course: He is recovering well, no pain at this time. Minimal to no swelling. He has been taking it easy.      Interval Updates:    1/8/2025: He is doing well. No pain or swelling at this time. He has resumed normal activities.      Family History   Problem Relation Age of Onset    Thyroid Mother     Heart Disease Father         Hyperlipidemia/CAD    Hypertension Father     Cancer Maternal Uncle         Leukemia    Cancer Maternal Uncle     Breast Cancer Paternal Aunt     Cancer Maternal Grandfather     Cancer Paternal Grandfather     BRCA 1 Neg Hx     BRCA 2 Neg Hx     BRCA 1 Carrier  Neg Hx     BRCA 2 Carrier Neg Hx        Social History     Socioeconomic History    Marital status: Single     Spouse name: Not on file    Number of children: 0    Years of education: 18    Highest education level: Not on file   Occupational History    Occupation: Construction   Tobacco Use    Smoking status: Never    Smokeless tobacco: Former     Types: Chew    Tobacco comments:         Vaping Use    Vaping status: Never Used   Substance and Sexual Activity    Alcohol use: Yes     Alcohol/week: 3.6 oz     Types: 6 Standard drinks or equivalent per week     Comment: 6 per week    Drug use: No    Sexual activity: Not Currently     Partners: Female   Other Topics Concern    Not on file   Social History Narrative    Not on file     Social Drivers of Health     Financial Resource Strain: Patient Declined (3/26/2024)    Overall Financial Resource Strain (CARDIA)     Difficulty of Paying Living Expenses: Patient declined   Food Insecurity: Patient Declined (3/26/2024)    Hunger Vital Sign     Worried About Running Out of Food in the Last Year: Patient declined     Ran Out of Food in the Last Year: Patient declined   Transportation Needs: Patient Declined  (3/26/2024)    PRAPARE - Transportation     Lack of Transportation (Medical): Patient declined     Lack of Transportation (Non-Medical): Patient declined   Physical Activity: Patient Declined (3/26/2024)    Exercise Vital Sign     Days of Exercise per Week: Patient declined     Minutes of Exercise per Session: Patient declined   Stress: Patient Declined (3/26/2024)    [place] of Occupational Health - Occupational Stress Questionnaire     Feeling of Stress : Patient declined   Social Connections: Patient Declined (3/26/2024)    Social Connection and Isolation Panel [NHANES]     Frequency of Communication with Friends and Family: Patient declined     Frequency of Social Gatherings with Friends and Family: Patient declined     Attends Cheondoism Services: Patient declined     Active Member of Clubs or Organizations: Patient declined     Attends Club or Organization Meetings: Patient declined     Marital Status: Patient declined   Intimate Partner Violence: Not on file   Housing Stability: Patient Declined (3/26/2024)    Housing Stability Vital Sign     Unable to Pay for Housing in the Last Year: Patient declined     Number of Places Lived in the Last Year: Not on file     Unstable Housing in the Last Year: Patient declined       Past Surgical History:   Procedure Laterality Date    RI EXCIS EPIDIDYMIS LOCAL LESION Right 11/8/2024    Procedure: RIGHT EPIDIDYMAL CYST EXCISION;  Surgeon: Delmar Rico M.D.;  Location: [facility];  Service: Urology    UMBILICAL HERNIA REPAIR  02/28/2014    Performed by Dudley Avlarado M.D. at SURGERY [facility] ORS    OTHER      dental implant       Past Medical History:   Diagnosis Date    Bronchitis     as a child    High cholesterol 2024    no medication    Hyperlipidemia     Pneumonia     as,a child       Current Outpatient Medications   Medication Sig Dispense Refill    Ascorbic Acid (VITAMIN C PO) Take 1 Tablet by mouth every evening.        Omega-3 Fatty Acids  (FISH OIL PO) Take 1 Tablet by mouth 2 times a day.        Cholecalciferol (VITAMIN D PO) Take 1 Tablet by mouth every evening.          No current facility-administered medications for this visit.       Allergies   Allergen Reactions    Rocephin [Ceftriaxone Sodium] Hives    Sulfa Drugs Rash     Per PT       Objective  There were no vitals taken for this visit.  Physical Exam  Constitutional:       Appearance: Normal appearance.   HENT:      Head: Normocephalic and atraumatic.   Pulmonary:      Effort: Pulmonary effort is normal.   Genitourinary:     Testes: Normal.      Comments: no swelling on the right. Non-tender to palpation  Musculoskeletal:      Comments: Incision c/d/I healing well   Skin:     General: Skin is warm and dry.   Neurological:      Mental Status: He is alert.   Psychiatric:         Mood and Affect: Mood normal.         Behavior: Behavior normal.       Labs:      SURGICAL PATHOLOGY CONSULTATION       FINAL DIAGNOSIS:     A. Right epididymal cyst:          Segment of epididymis with a cyst-like structure lined by benign           cuboidal epithelium with associated spermatozoa, consistent           with spermatocele.          No atypical or malignant features identified.     Imaging: none    Assessment    Mr. Lagunas is a 39 year old gentleman s/p right epididymal cyst excision on 11/8/2024. He is recovering well with complete resolution of pain and swelling. He is clear for all activities. We discussed that there is a risk it could come back, however, that is a low likelihood.     Plan    1. Epididymal cyst      RTC prn

## (undated) DEVICE — CLIP MED INTNL HRZN TI ESCP - (25/BX) DISCONTINUED ORDER 21732

## (undated) DEVICE — DERMABOND ADVANCED - (12EA/BX)

## (undated) DEVICE — SET EXTENSION WITH 2 PORTS (48EA/CA) ***PART #2C8610 IS A SUBSTITUTE*****

## (undated) DEVICE — TRAY SRGPRP PVP IOD WT PRP - (20/CA)

## (undated) DEVICE — STAPLER SKIN DISP - (6/BX 10BX/CA) VISISTAT

## (undated) DEVICE — NEEDLE NON SAFETY 25 GA X 1 1/2 IN HYPO (100EA/BX)

## (undated) DEVICE — GLOVE SZ 7 BIOGEL PI MICRO - PF LF (50PR/BX 4BX/CA)

## (undated) DEVICE — GLOVE BIOGEL SZ 6.5 SURGICAL PF LTX (50PR/BX 4BX/CA)

## (undated) DEVICE — ELECTRODE NEEDLE NON-SAFETY 2.8 IN (150EA/CT)

## (undated) DEVICE — SENSOR OXIMETER ADULT SPO2 RD SET (20EA/BX)

## (undated) DEVICE — SET LEADWIRE 5 LEAD BEDSIDE DISPOSABLE ECG (1SET OF 5/EA)

## (undated) DEVICE — SUTURE 4-0 MONOCRYL PLUS PS-1 - 27 INCH (36/BX)

## (undated) DEVICE — TUBING CLEARLINK DUO-VENT - C-FLO (48EA/CA)

## (undated) DEVICE — SPONGE GAUZESTER 4 X 4 4PLY - (128PK/CA)

## (undated) DEVICE — SUTURE GENERAL

## (undated) DEVICE — CANISTER SUCTION 3000ML MECHANICAL FILTER AUTO SHUTOFF MEDI-VAC NONSTERILE LF DISP (40EA/CA)

## (undated) DEVICE — TOWELS CLOTH SURGICAL - (4/PK 20PK/CA)

## (undated) DEVICE — DRAIN PENROSE STERILE 1/4 X - 18 IN (25EA/BX)

## (undated) DEVICE — LACTATED RINGERS INJ 1000 ML - (14EA/CA 60CA/PF)

## (undated) DEVICE — SUTURE 3-0 VICRYL PLUS SH - 8X 18 INCH (12/BX)

## (undated) DEVICE — SYRINGE 10 ML CONTROL LL (25EA/BX 4BX/CA)

## (undated) DEVICE — DRAPE LAPAROTOMY T SHEET - (12EA/CA)

## (undated) DEVICE — COVER LIGHT HANDLE ALC PLUS DISP (18EA/BX)

## (undated) DEVICE — GLOVE BIOGEL PI INDICATOR SZ 7.5 SURGICAL PF LF -(50/BX 4BX/CA)

## (undated) DEVICE — PACK MINOR BASIN - (2EA/CA)

## (undated) DEVICE — SUTURE 0 VICRYL PLUS CT-1 - 36 INCH (36/BX)

## (undated) DEVICE — GOWN WARMING STANDARD FLEX - (30/CA)

## (undated) DEVICE — SUCTION INSTRUMENT YANKAUER BULBOUS TIP W/O VENT (50EA/CA)

## (undated) DEVICE — SODIUM CHL IRRIGATION 0.9% 1000ML (12EA/CA)